# Patient Record
Sex: MALE | Race: WHITE | NOT HISPANIC OR LATINO | Employment: FULL TIME | URBAN - METROPOLITAN AREA
[De-identification: names, ages, dates, MRNs, and addresses within clinical notes are randomized per-mention and may not be internally consistent; named-entity substitution may affect disease eponyms.]

---

## 2023-03-08 ENCOUNTER — TELEPHONE (OUTPATIENT)
Dept: PRIMARY CARE | Facility: CLINIC | Age: 58
End: 2023-03-08
Payer: COMMERCIAL

## 2023-03-08 NOTE — TELEPHONE ENCOUNTER
Pt left a msg stating that he had a VV for a sinus infection and was put on antibiotics for 10 days.  He finished the antibiotics and is not feeling any better.  He is asking for a referral to see ENT.

## 2023-03-09 DIAGNOSIS — J32.9 CHRONIC SINUSITIS, UNSPECIFIED: ICD-10-CM

## 2023-03-09 RX ORDER — AZELASTINE 1 MG/ML
2 SPRAY, METERED NASAL 2 TIMES DAILY
Qty: 30 ML | Refills: 0 | Status: SHIPPED | OUTPATIENT
Start: 2023-03-09 | End: 2023-04-03

## 2023-03-16 DIAGNOSIS — J32.9 CHRONIC SINUSITIS, UNSPECIFIED LOCATION: ICD-10-CM

## 2023-03-16 NOTE — TELEPHONE ENCOUNTER
Referral placed for ENT.  He will go to whomever is the most local and make own appointment.  Number given.

## 2023-04-03 DIAGNOSIS — J32.9 CHRONIC SINUSITIS, UNSPECIFIED: ICD-10-CM

## 2023-04-03 RX ORDER — AZELASTINE 1 MG/ML
SPRAY, METERED NASAL
Qty: 30 ML | Refills: 11 | Status: SHIPPED | OUTPATIENT
Start: 2023-04-03 | End: 2023-07-24 | Stop reason: ALTCHOICE

## 2023-05-31 DIAGNOSIS — I10 ESSENTIAL (PRIMARY) HYPERTENSION: ICD-10-CM

## 2023-05-31 RX ORDER — LISINOPRIL AND HYDROCHLOROTHIAZIDE 10; 12.5 MG/1; MG/1
TABLET ORAL
Qty: 90 TABLET | Refills: 3 | Status: SHIPPED | OUTPATIENT
Start: 2023-05-31 | End: 2024-05-31

## 2023-06-01 NOTE — TELEPHONE ENCOUNTER
Vianey from Dr. Latesha Rueda's office called questioned referral that had diagnosis for chronic sinusitis that Dr. Rueda does not treat. After speaking to MA in our office it was decided that referral for endo was put in, in error. LVM back to Vianey that this was done in error and to disregard.

## 2023-07-24 ENCOUNTER — OFFICE VISIT (OUTPATIENT)
Dept: PRIMARY CARE | Facility: CLINIC | Age: 58
End: 2023-07-24
Payer: COMMERCIAL

## 2023-07-24 VITALS
WEIGHT: 213.6 LBS | HEART RATE: 73 BPM | TEMPERATURE: 97.7 F | HEIGHT: 73 IN | DIASTOLIC BLOOD PRESSURE: 84 MMHG | BODY MASS INDEX: 28.31 KG/M2 | SYSTOLIC BLOOD PRESSURE: 124 MMHG

## 2023-07-24 DIAGNOSIS — Z00.00 HEALTHCARE MAINTENANCE: Primary | ICD-10-CM

## 2023-07-24 DIAGNOSIS — E78.5 ELEVATED LIPIDS: ICD-10-CM

## 2023-07-24 DIAGNOSIS — Z12.5 SCREENING FOR PROSTATE CANCER: ICD-10-CM

## 2023-07-24 DIAGNOSIS — I10 HYPERTENSION, UNSPECIFIED TYPE: ICD-10-CM

## 2023-07-24 DIAGNOSIS — I25.10 PRECLINICAL CORONARY ARTERY DISEASE: ICD-10-CM

## 2023-07-24 PROBLEM — R79.89 ABNORMAL LFTS: Status: RESOLVED | Noted: 2023-07-24 | Resolved: 2023-07-24

## 2023-07-24 PROBLEM — J30.9 ALLERGIC RHINITIS: Status: ACTIVE | Noted: 2023-07-24

## 2023-07-24 PROBLEM — J32.9 CHRONIC SINUSITIS: Status: ACTIVE | Noted: 2023-07-24

## 2023-07-24 PROBLEM — K58.9 IBS (IRRITABLE BOWEL SYNDROME): Status: RESOLVED | Noted: 2023-07-24 | Resolved: 2023-07-24

## 2023-07-24 PROBLEM — R74.8 ELEVATED SERUM GAMMA-GLUTAMYL TRANSFERASE LEVEL: Status: RESOLVED | Noted: 2023-07-24 | Resolved: 2023-07-24

## 2023-07-24 PROBLEM — R09.81 NASAL CONGESTION: Status: ACTIVE | Noted: 2023-07-24

## 2023-07-24 PROBLEM — R15.2 FECAL URGENCY: Status: RESOLVED | Noted: 2023-07-24 | Resolved: 2023-07-24

## 2023-07-24 PROBLEM — R09.82 POST-NASAL DRIP: Status: ACTIVE | Noted: 2023-07-24

## 2023-07-24 PROCEDURE — 1036F TOBACCO NON-USER: CPT | Performed by: NURSE PRACTITIONER

## 2023-07-24 PROCEDURE — 3074F SYST BP LT 130 MM HG: CPT | Performed by: NURSE PRACTITIONER

## 2023-07-24 PROCEDURE — 3079F DIAST BP 80-89 MM HG: CPT | Performed by: NURSE PRACTITIONER

## 2023-07-24 PROCEDURE — 99396 PREV VISIT EST AGE 40-64: CPT | Performed by: NURSE PRACTITIONER

## 2023-07-24 RX ORDER — BETAMETHASONE VALERATE 1 MG/G
CREAM TOPICAL
COMMUNITY
Start: 2015-07-27

## 2023-07-24 RX ORDER — PSEUDOEPHEDRINE HCL 30 MG
TABLET ORAL
COMMUNITY
Start: 2022-06-07

## 2023-07-24 RX ORDER — ATORVASTATIN CALCIUM 20 MG/1
20 TABLET, FILM COATED ORAL DAILY
COMMUNITY
End: 2023-08-29

## 2023-07-24 ASSESSMENT — ENCOUNTER SYMPTOMS
ARTHRALGIAS: 0
WHEEZING: 0
BLOOD IN STOOL: 0
DYSURIA: 0
HEMATURIA: 0
FEVER: 0
EYE PAIN: 0
ADENOPATHY: 0
PALPITATIONS: 0
BACK PAIN: 0
FATIGUE: 0
COUGH: 1
CONSTIPATION: 0
SINUS PRESSURE: 1
NUMBNESS: 0
POLYDIPSIA: 0
NERVOUS/ANXIOUS: 0
SPEECH DIFFICULTY: 0
VOMITING: 0
UNEXPECTED WEIGHT CHANGE: 0
SLEEP DISTURBANCE: 1
EYE REDNESS: 0
CHEST TIGHTNESS: 0
ABDOMINAL PAIN: 0
POLYPHAGIA: 0
MYALGIAS: 0
NAUSEA: 0
RHINORRHEA: 1
DIFFICULTY URINATING: 0
CHILLS: 0
DIARRHEA: 0
SORE THROAT: 0
PHOTOPHOBIA: 0
BRUISES/BLEEDS EASILY: 0
HEADACHES: 0
EYE ITCHING: 0
NECK PAIN: 0
WEAKNESS: 0
DIZZINESS: 0
SHORTNESS OF BREATH: 0
FREQUENCY: 0
DYSPHORIC MOOD: 0
EYE DISCHARGE: 0

## 2023-07-24 ASSESSMENT — PATIENT HEALTH QUESTIONNAIRE - PHQ9
2. FEELING DOWN, DEPRESSED OR HOPELESS: NOT AT ALL
SUM OF ALL RESPONSES TO PHQ9 QUESTIONS 1 & 2: 0
1. LITTLE INTEREST OR PLEASURE IN DOING THINGS: NOT AT ALL

## 2023-07-24 NOTE — PROGRESS NOTES
"Subjective   Patient ID: Mazin Rausch is a 58 y.o. male who presents for transition of care and Annual Exam.    HPI  Last well exam: 1 year ago  Overall health has been good. Has been dealing with chronic sinus symptoms and also brain fog.  There have been no changes in the patients PMH, PSH, FH or social history. Reviewed today.  Diet:Overall tries to eat pretty healthy  Exercise: Treadmill and bikes- bikes 3 times a week. Treadmill daily in the cooler weather-15 minutes  Dental: Regular dental exams. Brushes 1 times daily. Flosses one time daily.  Vision: Last Eye exam 4/2023    Corrected Vision with glasses  Tobacco Use: No tobacco.  Alcohol Use: 1 beer once a month  Sexually active:  and sexually active. No issues with ED  Birth control: Wife postmenopausal  Immunizations: Discussed the Shingrix vaccine  Colonoscopy: Due in 2 years  Prostate Cancer Screening: ordered today    Last labs:   Lab Results   Component Value Date    GLUCOSE 91 06/07/2022    CALCIUM 9.5 06/07/2022     06/07/2022    K 4.4 06/07/2022    CO2 31 06/07/2022     06/07/2022    BUN 14 06/07/2022    CREATININE 0.89 06/07/2022      Lab Results   Component Value Date    WBC 5.8 06/07/2022    HGB 15.9 06/07/2022    HCT 48.9 06/07/2022    MCV 89 06/07/2022     06/07/2022      Lab Results   Component Value Date    PSA 0.43 06/07/2022    PSA 0.51 10/23/2019      Lab Results   Component Value Date    CHOL 186 06/07/2022    CHOL 171 10/23/2019     Lab Results   Component Value Date    HDL 49.6 06/07/2022    HDL 47.0 10/23/2019     No results found for: \"LDLCALC\"  Lab Results   Component Value Date    TRIG 161 (H) 06/07/2022    TRIG 155 (H) 10/23/2019     No components found for: \"CHOLHDL\"       Review of Systems   Constitutional:  Negative for chills, fatigue, fever and unexpected weight change.   HENT:  Positive for postnasal drip, rhinorrhea and sinus pressure. Negative for congestion, ear pain, hearing loss, nosebleeds, " "sore throat and tinnitus.    Eyes:  Negative for photophobia, pain, discharge, redness, itching and visual disturbance.   Respiratory:  Positive for cough. Negative for chest tightness, shortness of breath and wheezing.    Cardiovascular:  Negative for chest pain, palpitations and leg swelling.   Gastrointestinal:  Negative for abdominal pain, blood in stool, constipation, diarrhea, nausea and vomiting.   Endocrine: Negative for cold intolerance, heat intolerance, polydipsia, polyphagia and polyuria.   Genitourinary:  Negative for difficulty urinating, dysuria, frequency, hematuria and urgency.   Musculoskeletal:  Negative for arthralgias, back pain, myalgias and neck pain.   Skin:  Negative for rash.   Neurological:  Negative for dizziness, syncope, speech difficulty, weakness, numbness and headaches.   Hematological:  Negative for adenopathy. Does not bruise/bleed easily.   Psychiatric/Behavioral:  Positive for sleep disturbance. Negative for dysphoric mood. The patient is not nervous/anxious.      Objective   /84   Pulse 73   Temp 36.5 °C (97.7 °F)   Ht 1.842 m (6' 0.5\")   Wt 96.9 kg (213 lb 9.6 oz)   BMI 28.57 kg/m²     Physical Exam  Constitutional:       General: He is not in acute distress.     Appearance: Normal appearance. He is not toxic-appearing.   HENT:      Head: Normocephalic and atraumatic.      Right Ear: Tympanic membrane and ear canal normal.      Left Ear: Tympanic membrane and ear canal normal.      Nose: Nose normal.      Mouth/Throat:      Mouth: Mucous membranes are moist.      Pharynx: Oropharynx is clear.   Eyes:      Extraocular Movements: Extraocular movements intact.      Conjunctiva/sclera: Conjunctivae normal.      Pupils: Pupils are equal, round, and reactive to light.   Neck:      Thyroid: No thyroid mass or thyromegaly.      Vascular: No carotid bruit.   Cardiovascular:      Rate and Rhythm: Normal rate and regular rhythm.      Pulses: Normal pulses.      Heart sounds: " Normal heart sounds, S1 normal and S2 normal. No murmur heard.  Pulmonary:      Effort: Pulmonary effort is normal. No respiratory distress.      Breath sounds: Normal breath sounds.   Abdominal:      General: Abdomen is flat. Bowel sounds are normal.      Palpations: Abdomen is soft.      Tenderness: There is no abdominal tenderness.   Musculoskeletal:         General: Normal range of motion.      Cervical back: Normal range of motion and neck supple.      Right lower leg: No edema.      Left lower leg: No edema.   Lymphadenopathy:      Cervical: No cervical adenopathy.   Skin:     General: Skin is warm and dry.   Neurological:      Mental Status: He is alert and oriented to person, place, and time.      Cranial Nerves: No cranial nerve deficit.      Sensory: No sensory deficit.      Motor: No weakness.      Coordination: Coordination normal.      Gait: Gait normal.      Deep Tendon Reflexes: Reflexes are normal and symmetric. Reflexes normal.   Psychiatric:         Attention and Perception: Attention normal.         Mood and Affect: Mood and affect normal.         Speech: Speech normal.         Behavior: Behavior normal.         Thought Content: Thought content normal.         Judgment: Judgment normal.         Assessment/Plan   Problem List Items Addressed This Visit       Hypertension - Primary    Relevant Orders    CT cardiac scoring wo IV contrast    Elevated lipids    Relevant Orders    CT cardiac scoring wo IV contrast    Preclinical coronary artery disease    Relevant Orders    CT cardiac scoring wo IV contrast     Other Visit Diagnoses       Screening for prostate cancer        Relevant Orders    Prostate Specific Antigen    Healthcare maintenance        Relevant Orders    Comprehensive Metabolic Panel    CBC    Lipid Panel    TSH with reflex to Free T4 if abnormal                 It has been a pleasure seeing you today!

## 2023-07-24 NOTE — PATIENT INSTRUCTIONS
Focus on a low sodium/low fat diet (whole grains, fresh fruits and vegetables, lean meats). Avoid foods high in sugar.  Increase exercise as tolerated.  Obtain fasting labs as ordered today.  Ct calcium score ordered today.

## 2023-07-31 ENCOUNTER — LAB (OUTPATIENT)
Dept: LAB | Facility: LAB | Age: 58
End: 2023-07-31
Payer: COMMERCIAL

## 2023-07-31 DIAGNOSIS — Z12.5 SCREENING FOR PROSTATE CANCER: ICD-10-CM

## 2023-07-31 DIAGNOSIS — Z00.00 HEALTHCARE MAINTENANCE: ICD-10-CM

## 2023-07-31 LAB
ALANINE AMINOTRANSFERASE (SGPT) (U/L) IN SER/PLAS: 16 U/L (ref 10–52)
ALBUMIN (G/DL) IN SER/PLAS: 4.3 G/DL (ref 3.4–5)
ALKALINE PHOSPHATASE (U/L) IN SER/PLAS: 69 U/L (ref 33–120)
ANION GAP IN SER/PLAS: 10 MMOL/L (ref 10–20)
ASPARTATE AMINOTRANSFERASE (SGOT) (U/L) IN SER/PLAS: 21 U/L (ref 9–39)
BILIRUBIN TOTAL (MG/DL) IN SER/PLAS: 0.7 MG/DL (ref 0–1.2)
CALCIUM (MG/DL) IN SER/PLAS: 9.1 MG/DL (ref 8.6–10.3)
CARBON DIOXIDE, TOTAL (MMOL/L) IN SER/PLAS: 29 MMOL/L (ref 21–32)
CHLORIDE (MMOL/L) IN SER/PLAS: 104 MMOL/L (ref 98–107)
CHOLESTEROL (MG/DL) IN SER/PLAS: 171 MG/DL (ref 0–199)
CHOLESTEROL IN HDL (MG/DL) IN SER/PLAS: 49.1 MG/DL
CHOLESTEROL/HDL RATIO: 3.5
CREATININE (MG/DL) IN SER/PLAS: 0.9 MG/DL (ref 0.5–1.3)
ERYTHROCYTE DISTRIBUTION WIDTH (RATIO) BY AUTOMATED COUNT: 12.9 % (ref 11.5–14.5)
ERYTHROCYTE MEAN CORPUSCULAR HEMOGLOBIN CONCENTRATION (G/DL) BY AUTOMATED: 33 G/DL (ref 32–36)
ERYTHROCYTE MEAN CORPUSCULAR VOLUME (FL) BY AUTOMATED COUNT: 88 FL (ref 80–100)
ERYTHROCYTES (10*6/UL) IN BLOOD BY AUTOMATED COUNT: 5.31 X10E12/L (ref 4.5–5.9)
GFR MALE: >90 ML/MIN/1.73M2
GLUCOSE (MG/DL) IN SER/PLAS: 95 MG/DL (ref 74–99)
HEMATOCRIT (%) IN BLOOD BY AUTOMATED COUNT: 46.6 % (ref 41–52)
HEMOGLOBIN (G/DL) IN BLOOD: 15.4 G/DL (ref 13.5–17.5)
LDL: 89 MG/DL (ref 0–99)
LEUKOCYTES (10*3/UL) IN BLOOD BY AUTOMATED COUNT: 7 X10E9/L (ref 4.4–11.3)
PLATELETS (10*3/UL) IN BLOOD AUTOMATED COUNT: 249 X10E9/L (ref 150–450)
POTASSIUM (MMOL/L) IN SER/PLAS: 4.4 MMOL/L (ref 3.5–5.3)
PROSTATE SPECIFIC AG (NG/ML) IN SER/PLAS: 0.4 NG/ML (ref 0–4)
PROTEIN TOTAL: 6.6 G/DL (ref 6.4–8.2)
SODIUM (MMOL/L) IN SER/PLAS: 139 MMOL/L (ref 136–145)
THYROTROPIN (MIU/L) IN SER/PLAS BY DETECTION LIMIT <= 0.05 MIU/L: 1.32 MIU/L (ref 0.44–3.98)
TRIGLYCERIDE (MG/DL) IN SER/PLAS: 167 MG/DL (ref 0–149)
UREA NITROGEN (MG/DL) IN SER/PLAS: 14 MG/DL (ref 6–23)
VLDL: 33 MG/DL (ref 0–40)

## 2023-07-31 PROCEDURE — 84443 ASSAY THYROID STIM HORMONE: CPT

## 2023-07-31 PROCEDURE — 80061 LIPID PANEL: CPT

## 2023-07-31 PROCEDURE — 36415 COLL VENOUS BLD VENIPUNCTURE: CPT

## 2023-07-31 PROCEDURE — 84153 ASSAY OF PSA TOTAL: CPT

## 2023-07-31 PROCEDURE — 85027 COMPLETE CBC AUTOMATED: CPT

## 2023-07-31 PROCEDURE — 80053 COMPREHEN METABOLIC PANEL: CPT

## 2023-08-29 DIAGNOSIS — E78.5 HYPERLIPIDEMIA, UNSPECIFIED: ICD-10-CM

## 2023-08-29 RX ORDER — ATORVASTATIN CALCIUM 20 MG/1
20 TABLET, FILM COATED ORAL DAILY
Qty: 90 TABLET | Refills: 3 | Status: SHIPPED | OUTPATIENT
Start: 2023-08-29 | End: 2023-10-19

## 2023-10-01 ASSESSMENT — SLIT LAMP EXAM - LIDS
COMMENTS: GOOD POSITION
COMMENTS: GOOD POSITION

## 2023-10-01 ASSESSMENT — EXTERNAL EXAM - LEFT EYE: OS_EXAM: NORMAL

## 2023-10-01 ASSESSMENT — EXTERNAL EXAM - RIGHT EYE: OD_EXAM: NORMAL

## 2023-10-03 ENCOUNTER — OFFICE VISIT (OUTPATIENT)
Dept: OPHTHALMOLOGY | Facility: CLINIC | Age: 58
End: 2023-10-03
Payer: COMMERCIAL

## 2023-10-03 DIAGNOSIS — H43.811 PVD (POSTERIOR VITREOUS DETACHMENT), RIGHT EYE: Primary | ICD-10-CM

## 2023-10-03 DIAGNOSIS — H52.13 MYOPIA, BILATERAL: ICD-10-CM

## 2023-10-03 DIAGNOSIS — Z83.518 FAMILY HISTORY OF MACULAR DEGENERATION: ICD-10-CM

## 2023-10-03 DIAGNOSIS — H18.523 ANTERIOR BASEMENT MEMBRANE DYSTROPHY (ABMD) OF BOTH EYES: ICD-10-CM

## 2023-10-03 DIAGNOSIS — H52.203 ASTIGMATISM OF BOTH EYES, UNSPECIFIED TYPE: ICD-10-CM

## 2023-10-03 DIAGNOSIS — H25.813 COMBINED FORMS OF AGE-RELATED CATARACT OF BOTH EYES: ICD-10-CM

## 2023-10-03 DIAGNOSIS — H52.4 PRESBYOPIA: ICD-10-CM

## 2023-10-03 PROCEDURE — 99203 OFFICE O/P NEW LOW 30 MIN: CPT | Performed by: OPHTHALMOLOGY

## 2023-10-03 ASSESSMENT — ENCOUNTER SYMPTOMS
HEMATOLOGIC/LYMPHATIC NEGATIVE: 0
PSYCHIATRIC NEGATIVE: 0
GASTROINTESTINAL NEGATIVE: 0
ENDOCRINE NEGATIVE: 0
ALLERGIC/IMMUNOLOGIC NEGATIVE: 0
NEUROLOGICAL NEGATIVE: 0
EYES NEGATIVE: 0
CONSTITUTIONAL NEGATIVE: 0
MUSCULOSKELETAL NEGATIVE: 0
RESPIRATORY NEGATIVE: 0
CARDIOVASCULAR NEGATIVE: 0

## 2023-10-03 ASSESSMENT — CUP TO DISC RATIO
OD_RATIO: 0.25
OS_RATIO: 0.25

## 2023-10-03 ASSESSMENT — REFRACTION_MANIFEST
OD_ADD: +2.25
OD_CYLINDER: -2.50
OD_SPHERE: -1.50
OD_AXIS: 105
OS_SPHERE: -2.50
OS_CYLINDER: -1.50
OS_ADD: +2.25
OS_AXIS: 090

## 2023-10-03 ASSESSMENT — REFRACTION_WEARINGRX
OS_CYLINDER: -1.50
OS_ADD: +1.50
OD_SPHERE: -1.50
OD_ADD: +1.50
OS_AXIS: 090
OD_CYLINDER: -2.50
OD_AXIS: 105
OS_SPHERE: -2.50

## 2023-10-03 ASSESSMENT — VISUAL ACUITY
OS_CC+: +
CORRECTION_TYPE: GLASSES
METHOD: SNELLEN - LINEAR
OD_CC: 20/40
OS_CC: 20/25

## 2023-10-03 ASSESSMENT — TONOMETRY
OD_IOP_MMHG: 16
IOP_METHOD: GOLDMANN APPLANATION
OS_IOP_MMHG: 16

## 2023-10-03 NOTE — PROGRESS NOTES
Assessment/Plan   Diagnoses and all orders for this visit:    PVD (posterior vitreous detachment), right eye  Family history of macular degeneration - father  -Patient with flashes and floaters for 6 weeks, symptoms diminishing with time.  -No retinal tear or detachment seen on exam. Retinal detachment symptoms discussed.   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 Will refer to retina service for evaluation.     Combined forms of age-related cataract of both eyes  -Not visually significant at this time. Monitor.     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  Recommended to use artificial tears as needed.    Myopia, bilateral  Astigmatism of both eyes, unspecified type  Presbyopia  -Continue current glasses at this time.      No history of intraocular surgery/refractive surgery.   No FH of AMD/glaucoma

## 2023-10-04 ENCOUNTER — ANCILLARY PROCEDURE (OUTPATIENT)
Dept: RADIOLOGY | Facility: CLINIC | Age: 58
End: 2023-10-04
Payer: COMMERCIAL

## 2023-10-04 DIAGNOSIS — I10 ESSENTIAL (PRIMARY) HYPERTENSION: ICD-10-CM

## 2023-10-04 DIAGNOSIS — I25.10 ATHEROSCLEROTIC HEART DISEASE OF NATIVE CORONARY ARTERY WITHOUT ANGINA PECTORIS: ICD-10-CM

## 2023-10-04 DIAGNOSIS — I25.10 PRECLINICAL CORONARY ARTERY DISEASE: Primary | ICD-10-CM

## 2023-10-04 DIAGNOSIS — E78.5 HYPERLIPIDEMIA, UNSPECIFIED: ICD-10-CM

## 2023-10-04 PROBLEM — H52.4 PRESBYOPIA: Status: ACTIVE | Noted: 2023-10-04

## 2023-10-04 PROBLEM — H52.203 ASTIGMATISM OF BOTH EYES: Status: ACTIVE | Noted: 2023-10-04

## 2023-10-04 PROBLEM — H18.523 ANTERIOR BASEMENT MEMBRANE DYSTROPHY (ABMD) OF BOTH EYES: Status: ACTIVE | Noted: 2023-10-04

## 2023-10-04 PROBLEM — Z83.518 FAMILY HISTORY OF MACULAR DEGENERATION: Status: ACTIVE | Noted: 2023-10-04

## 2023-10-04 PROBLEM — H52.13 MYOPIA, BILATERAL: Status: ACTIVE | Noted: 2023-10-04

## 2023-10-04 PROBLEM — H25.813 COMBINED FORMS OF AGE-RELATED CATARACT OF BOTH EYES: Status: ACTIVE | Noted: 2023-10-04

## 2023-10-04 PROBLEM — H43.811 PVD (POSTERIOR VITREOUS DETACHMENT), RIGHT EYE: Status: ACTIVE | Noted: 2023-10-04

## 2023-10-04 PROCEDURE — 75571 CT HRT W/O DYE W/CA TEST: CPT

## 2023-10-05 DIAGNOSIS — I25.10 PRECLINICAL CORONARY ARTERY DISEASE: Primary | ICD-10-CM

## 2023-10-18 ENCOUNTER — APPOINTMENT (OUTPATIENT)
Dept: OPHTHALMOLOGY | Facility: CLINIC | Age: 58
End: 2023-10-18
Payer: COMMERCIAL

## 2023-10-19 ENCOUNTER — OFFICE VISIT (OUTPATIENT)
Dept: CARDIOLOGY | Facility: CLINIC | Age: 58
End: 2023-10-19
Payer: COMMERCIAL

## 2023-10-19 VITALS
DIASTOLIC BLOOD PRESSURE: 72 MMHG | HEART RATE: 79 BPM | HEIGHT: 72 IN | BODY MASS INDEX: 28.28 KG/M2 | WEIGHT: 208.8 LBS | SYSTOLIC BLOOD PRESSURE: 122 MMHG

## 2023-10-19 DIAGNOSIS — I10 HYPERTENSION, UNSPECIFIED TYPE: Primary | ICD-10-CM

## 2023-10-19 DIAGNOSIS — E78.5 ELEVATED LIPIDS: ICD-10-CM

## 2023-10-19 DIAGNOSIS — R93.1 AGATSTON CORONARY ARTERY CALCIUM SCORE GREATER THAN 400: ICD-10-CM

## 2023-10-19 DIAGNOSIS — I25.10 CORONARY ARTERY DISEASE INVOLVING NATIVE CORONARY ARTERY OF NATIVE HEART WITHOUT ANGINA PECTORIS: ICD-10-CM

## 2023-10-19 PROCEDURE — 99204 OFFICE O/P NEW MOD 45 MIN: CPT | Performed by: INTERNAL MEDICINE

## 2023-10-19 PROCEDURE — 3078F DIAST BP <80 MM HG: CPT | Performed by: INTERNAL MEDICINE

## 2023-10-19 PROCEDURE — 99214 OFFICE O/P EST MOD 30 MIN: CPT | Performed by: INTERNAL MEDICINE

## 2023-10-19 PROCEDURE — 1036F TOBACCO NON-USER: CPT | Performed by: INTERNAL MEDICINE

## 2023-10-19 PROCEDURE — 3074F SYST BP LT 130 MM HG: CPT | Performed by: INTERNAL MEDICINE

## 2023-10-19 RX ORDER — ATORVASTATIN CALCIUM 40 MG/1
40 TABLET, FILM COATED ORAL DAILY
Qty: 30 TABLET | Refills: 11 | Status: SHIPPED | OUTPATIENT
Start: 2023-10-19 | End: 2023-11-20

## 2023-10-19 RX ORDER — ASPIRIN 81 MG/1
81 TABLET ORAL DAILY
COMMUNITY

## 2023-10-19 NOTE — PROGRESS NOTES
Chief Complaint:   Coronary Artery Disease     History Of Present Illness:    Mazin Rausch is a 58 y.o. male presenting with for evaluation of preclinical coronary artery disease detected by coronary artery calcium scoring.   The patient is tolerating guideline-directed medical therapy with antiplatelet and statin medication and is compliant.  The patient exercises regularly and follows a heart healthy diet.  The patient has been well and is not having any anginal symptoms or dyspnea on exertion.      Review of Systems  All pertinent systems have been reviewed and are negative except for what is stated in the history of present illness.    All other systems have been reviewed and are negative and noncontributory to this patient's current ailments.  .     Last Recorded Vitals:  Visit Vitals  /72 (BP Location: Right arm, Patient Position: Sitting, BP Cuff Size: Adult)   Pulse 79   Ht 1.829 m (6')   Wt 94.7 kg (208 lb 12.8 oz)   BMI 28.32 kg/m²   Smoking Status Never   BSA 2.19 m²        Past Medical History:  He has a past medical history of Agatston coronary artery calcium score greater than 400 (10/19/2023), Allergic (Penicillin), Cataract, Coronary artery disease involving native coronary artery of native heart without angina pectoris (10/19/2023), Elevated serum gamma-glutamyl transferase level (07/24/2023), Fecal urgency (07/24/2023), HL (hearing loss), Hypertension, IBS (irritable bowel syndrome) (07/24/2023), Personal history of diseases of the skin and subcutaneous tissue (11/09/2015), Personal history of other diseases of the respiratory system (02/15/2016), and Personal history of other mental and behavioral disorders (04/04/2017).    Past Surgical History:  He has a past surgical history that includes [place] tooth extraction (2000) and Nasal septum surgery.      Social History:  He reports that he has never smoked. He has never been exposed to tobacco smoke. He has never used smokeless tobacco.  Alcohol use questions deferred to the physician. He reports that he does not use drugs.    Family History:  Family History   Problem Relation Name Age of Onset    Diabetes Mother      Hypertension Mother      Breast cancer Mother      Parkinsonism Father  89    Other (amd) Father          Allergies:  Penicillins    Outpatient Medications:  Current Outpatient Medications   Medication Instructions    aspirin 81 mg, oral, Daily    atorvastatin (LIPITOR) 20 mg, oral, Daily    betamethasone valerate (Valisone) 0.1 % cream APPLY  CREAM TOPICALLY TO AFFECTED AREA TWICE DAILY    lisinopriL-hydrochlorothiazide 10-12.5 mg tablet TAKE 1 TABLET BY MOUTH EVERY DAY    pseudoephedrine (Sudafed) 30 mg tablet oral, Every 4 hours RT       Physical Exam:  Physical Exam  Vitals reviewed.   Constitutional:       General: He is not in acute distress.     Appearance: Normal appearance.   HENT:      Head: Normocephalic and atraumatic.      Nose: Nose normal.   Eyes:      Conjunctiva/sclera: Conjunctivae normal.   Cardiovascular:      Rate and Rhythm: Normal rate and regular rhythm.      Pulses: Normal pulses.      Heart sounds: No murmur heard.  Pulmonary:      Effort: Pulmonary effort is normal. No respiratory distress.      Breath sounds: Normal breath sounds. No wheezing, rhonchi or rales.   Abdominal:      General: Bowel sounds are normal. There is no distension.      Palpations: Abdomen is soft.      Tenderness: There is no abdominal tenderness.   Musculoskeletal:         General: No swelling.      Right lower leg: No edema.      Left lower leg: No edema.   Skin:     General: Skin is warm and dry.      Capillary Refill: Capillary refill takes less than 2 seconds.   Neurological:      General: No focal deficit present.      Mental Status: He is alert.   Psychiatric:         Mood and Affect: Mood normal.           Last Labs:  CBC -  Lab Results   Component Value Date    WBC 7.0 07/31/2023    HGB 15.4 07/31/2023    HCT 46.6 07/31/2023     "MCV 88 07/31/2023     07/31/2023       CMP -  Lab Results   Component Value Date    CALCIUM 9.1 07/31/2023    PROT 6.6 07/31/2023    ALBUMIN 4.3 07/31/2023    AST 21 07/31/2023    ALT 16 07/31/2023    ALKPHOS 69 07/31/2023    BILITOT 0.7 07/31/2023       LIPID PANEL -   Lab Results   Component Value Date    CHOL 171 07/31/2023    HDL 49.1 07/31/2023    CHHDL 3.5 07/31/2023    VLDL 33 07/31/2023    TRIG 167 (H) 07/31/2023       RENAL FUNCTION PANEL -   Lab Results   Component Value Date    K 4.4 07/31/2023       No results found for: \"BNP\", \"HGBA1C\"    Last Cardiology Tests:  ECG:  No results found for this or any previous visit from the past 1095 days.    Echo:  No echocardiogram results found for the past 12 months     Narrative & Impression   Interpreted By:  Taylor Mendoza,   STUDY:  CT CARDIAC SCORING WO IV CONTRAST;  10/4/2023 12:56 pm      INDICATION:  Signs/Symptoms: preclinical CAD.      COMPARISON:  None.      ACCESSION NUMBER(S):  [id number]      ORDERING CLINICIAN:  CHRISTA PARHAM      TECHNIQUE:  Using prospective ECG gating, CT scan of the coronary arteries was  performed without intravenous contrast. Coronary calcium scoring  was  performed according to the method of Agatston.      FINDINGS:  The score and distribution of calcium in the coronary arteries is as  follows:      .32,  .45,  LCx 205.49,  RCA 6.87,      Total   862.13      The visualized mid/lower ascending thoracic aorta measures 3.7 cm in  diameter. The heart is normal in size. No pericardial effusion is  present.      No gross evidence of mediastinal or hilar lymphadenopathy or masses  is identified. The visualized segments of the lungs are normally  expanded.      The visualized subdiaphragmatic structures appear intact.      IMPRESSION:  1. Coronary artery calcium score of 862.13*.           Assessment/Plan       1. Hypertension, unspecified type  Hypertension: The patient's blood pressure has been " well-controlled at today's appointment or by recent primary care provider's measurements/home measurements and meets their goal blood pressure per the ACC/AHA guidelines.  The patient has been compliant with their anti-hypertensive medications and is following a low sodium/DASH diet and performs regular exercise.  I advised continuation of their present medical treatment and lifestyle modification.        2. Elevated lipids  Hyperlipidemia: The patient's lipids are well controlled on chronic statin therapy and they are not meeting their goal LDL cholesterol (70) per the ACC/AHA guidelines.  The patient is compliant and tolerating statin therapy and is following a heart health diet and performs regular exercise.  The benefits of lipid lowering therapy have been discussed with the patient/family/caregiver. Increase statin.      3. Coronary artery disease involving native coronary artery of native heart without angina pectoris  CAD: The patient's CAD, as detailed in the HPI, has been clinically stable, without any anginal symptoms or dyspnea.  The patient will continue treatment with guideline-directed medical therapy with antiplatelet and statin medications and will continue regular exercise and a heart healthy diet.         4. Agatston coronary artery calcium score greater than 400  As above.       José Miguel Shields MD    Exclusive of any other services or procedures performed, I, José Miguel Shields MD , spent 30 minutes in duration for this visit today.  This time consisted of chart review, obtaining history, and/or performing the exam as documented above as well as documenting the clinical information for the encounter in the electronic record, discussing treatment options, plans, and/or goals with patient, family, and/or caregiver, refilling medications, updating the electronic record, ordering medicines, lab work, imaging, referrals, and/or procedures as documented above and communicating with other [facility]  professionals. I have discussed the results of laboratory, radiology, and cardiology studies with the patient and their family/caregiver.

## 2023-10-25 PROBLEM — J31.0 CHRONIC RHINITIS: Status: ACTIVE | Noted: 2023-10-25

## 2023-10-25 PROBLEM — R44.8 FACIAL PRESSURE: Status: ACTIVE | Noted: 2023-10-25

## 2023-10-25 PROBLEM — E66.3 OVERWEIGHT WITH BODY MASS INDEX (BMI) OF 28 TO 28.9 IN ADULT: Status: ACTIVE | Noted: 2023-10-25

## 2023-10-25 PROBLEM — R00.2 HEART PALPITATIONS: Status: ACTIVE | Noted: 2023-10-25

## 2023-10-25 RX ORDER — MOMETASONE FUROATE 100 %
POWDER (GRAM) MISCELLANEOUS
COMMUNITY
Start: 2023-08-02

## 2023-10-25 RX ORDER — FLUORIDE (SODIUM) 1.1 %
PASTE (ML) DENTAL
COMMUNITY
Start: 2023-07-26

## 2023-10-26 ENCOUNTER — CONSULT (OUTPATIENT)
Dept: ALLERGY | Facility: CLINIC | Age: 58
End: 2023-10-26
Payer: COMMERCIAL

## 2023-10-26 VITALS — BODY MASS INDEX: 28.48 KG/M2 | WEIGHT: 210 LBS | DIASTOLIC BLOOD PRESSURE: 72 MMHG | SYSTOLIC BLOOD PRESSURE: 119 MMHG

## 2023-10-26 DIAGNOSIS — H10.45 CHRONIC ALLERGIC CONJUNCTIVITIS: ICD-10-CM

## 2023-10-26 DIAGNOSIS — J30.1 NON-SEASONAL ALLERGIC RHINITIS DUE TO POLLEN: ICD-10-CM

## 2023-10-26 DIAGNOSIS — J30.89 NON-SEASONAL ALLERGIC RHINITIS DUE TO OTHER ALLERGIC TRIGGER: Primary | ICD-10-CM

## 2023-10-26 PROCEDURE — 99204 OFFICE O/P NEW MOD 45 MIN: CPT | Performed by: ALLERGY & IMMUNOLOGY

## 2023-10-26 PROCEDURE — 95004 PERQ TESTS W/ALRGNC XTRCS: CPT | Performed by: ALLERGY & IMMUNOLOGY

## 2023-10-26 RX ORDER — OLOPATADINE HYDROCHLORIDE AND MOMETASONE FUROATE 25; 665 UG/1; UG/1
2 SPRAY, METERED NASAL 2 TIMES DAILY
Qty: 29 G | Refills: 11 | Status: SHIPPED | OUTPATIENT
Start: 2023-10-26 | End: 2023-12-07 | Stop reason: ALTCHOICE

## 2023-10-26 NOTE — PROGRESS NOTES
"Subjective   Patient ID:   [id number]   Mazin Rausch is a 58 y.o. male who presents for Allergy Testing.    Chief Complaint   Patient presents with    Allergy Testing          HPI  This patient is here to evaluate for:    PND, Sinus for a couple years.   No ocular sxs  Sinus infections, sleep disturbance - denieds    Meds: pseudoephrine (from behind the counter)  Lack of efficacy : flonase, xhance, azelastine - just irritated/burning sensation.      Had 5 years of allergy shots, ending 8 years ago.   Helped and was \"good\"   This was with another Allergist outside, Dr. Jones.    But then     Also saw ENT and sinus CT clear.     Trigger: alcohol but he doesn't drink much at all  Had a non-alcoholic drink and that bothered him.     Weather changes  Not bothered by strong smells except inexpensive candles.    Pmhx: NO prostate hypertrophy  HTN, cholesterolemia.     Review of Systems   All other systems reviewed and are negative.            Objective     /72 (BP Location: Right arm, Patient Position: Sitting, BP Cuff Size: Adult)   Wt 95.3 kg (210 lb)   BMI 28.48 kg/m²      Physical Exam  Constitutional:       General: He is not in acute distress.     Appearance: Normal appearance. He is not ill-appearing.   HENT:      Head: Normocephalic and atraumatic.      Right Ear: Tympanic membrane, ear canal and external ear normal.      Left Ear: Tympanic membrane, ear canal and external ear normal.      Nose: Congestion present. No rhinorrhea.      Comments: Mild ITH, no epistaxis, no polyps     Mouth/Throat:      Mouth: Mucous membranes are moist.      Pharynx: Oropharynx is clear. No oropharyngeal exudate or posterior oropharyngeal erythema.   Eyes:      General:         Right eye: No discharge.         Left eye: No discharge.      Conjunctiva/sclera: Conjunctivae normal.   Cardiovascular:      Rate and Rhythm: Normal rate and regular rhythm.      Heart sounds: Normal heart sounds. No murmur heard.     No friction " rub. No gallop.   Pulmonary:      Effort: Pulmonary effort is normal. No respiratory distress.      Breath sounds: Normal breath sounds. No stridor. No wheezing, rhonchi or rales.   Chest:      Chest wall: No tenderness.   Abdominal:      General: Abdomen is flat.      Palpations: Abdomen is soft.   Musculoskeletal:         General: Normal range of motion.      Cervical back: Normal range of motion and neck supple.   Lymphadenopathy:      Cervical: No cervical adenopathy.   Skin:     General: Skin is warm and dry.      Findings: No erythema, lesion or rash.   Neurological:      General: No focal deficit present.      Mental Status: He is alert. Mental status is at baseline.   Psychiatric:         Mood and Affect: Mood normal.         Behavior: Behavior normal.         Thought Content: Thought content normal.         Judgment: Judgment normal.          Current Outpatient Medications   Medication Sig Dispense Refill    aspirin 81 mg EC tablet Take 1 tablet (81 mg) by mouth once daily.      atorvastatin (Lipitor) 40 mg tablet Take 1 tablet (40 mg) by mouth once daily. 30 tablet 11    betamethasone valerate (Valisone) 0.1 % cream APPLY  CREAM TOPICALLY TO AFFECTED AREA TWICE DAILY      lisinopriL-hydrochlorothiazide 10-12.5 mg tablet TAKE 1 TABLET BY MOUTH EVERY DAY 90 tablet 3    mometasone furoate, bulk, 100 % powder Compound Mometasone 1mg capsule to be added to sinus rinse twice daily.      PreviDent 5000 Booster Plus 1.1 % dental paste USE IN PLACE OF TOOTH PASTE      pseudoephedrine (Sudafed) 30 mg tablet Take by mouth every 4 hours.       No current facility-administered medications for this visit.        Scratch skin tests were positive to:    Dust mites, dogs, tree, Alternaria mold.      Assessment/Plan   Diagnoses and all orders for this visit:  Non-seasonal allergic rhinitis due to other allergic trigger  -     olopatadine-mometasone (Ryaltris) 665-25 mcg/spray spray,non-aerosol; Administer 2 sprays into  affected nostril(s) 2 times a day.  Chronic allergic conjunctivitis  -     olopatadine-mometasone (Ryaltris) 665-25 mcg/spray spray,non-aerosol; Administer 2 sprays into affected nostril(s) 2 times a day.  Non-seasonal allergic rhinitis due to pollen  -     olopatadine-mometasone (Ryaltris) 665-25 mcg/spray spray,non-aerosol; Administer 2 sprays into affected nostril(s) 2 times a day.      We discussed the treatment options for this patient's allergies. I recommended allergy avoidance measures and handouts were given to the patient.  Also, other treatment options include medication and, if not improved or there is a desire to limit medication usage, this patient would qualify for allergy immunotherapy. Of note, he responded well to SCIT in past that was completed 8 years ago.     This patient will return to complete allergy testing with intradermal skin tests in 2-3 months. Be sure to be off antihistamines for 3 days.    Add the new nosespray      Darryl Rodriguez MD

## 2023-11-08 ENCOUNTER — TELEPHONE (OUTPATIENT)
Dept: CARDIOLOGY | Facility: CLINIC | Age: 58
End: 2023-11-08
Payer: COMMERCIAL

## 2023-11-08 NOTE — TELEPHONE ENCOUNTER
Spoke with pt and went over stress prep. NPO 4 hrs Prior, No medications to hold, no caffeine 24 hrs prior. Pt verbalized understanding

## 2023-11-13 ENCOUNTER — HOSPITAL ENCOUNTER (OUTPATIENT)
Dept: CARDIOLOGY | Facility: CLINIC | Age: 58
Discharge: HOME | End: 2023-11-13
Payer: COMMERCIAL

## 2023-11-13 VITALS
DIASTOLIC BLOOD PRESSURE: 74 MMHG | BODY MASS INDEX: 27.63 KG/M2 | SYSTOLIC BLOOD PRESSURE: 112 MMHG | WEIGHT: 204 LBS | HEIGHT: 72 IN | HEART RATE: 63 BPM

## 2023-11-13 DIAGNOSIS — E78.5 ELEVATED LIPIDS: ICD-10-CM

## 2023-11-13 DIAGNOSIS — I25.10 CORONARY ARTERY DISEASE INVOLVING NATIVE CORONARY ARTERY OF NATIVE HEART WITHOUT ANGINA PECTORIS: ICD-10-CM

## 2023-11-13 DIAGNOSIS — I10 HYPERTENSION, UNSPECIFIED TYPE: ICD-10-CM

## 2023-11-13 DIAGNOSIS — R93.1 AGATSTON CORONARY ARTERY CALCIUM SCORE GREATER THAN 400: ICD-10-CM

## 2023-11-13 PROCEDURE — A9502 TC99M TETROFOSMIN: HCPCS | Performed by: INTERNAL MEDICINE

## 2023-11-13 PROCEDURE — 78452 HT MUSCLE IMAGE SPECT MULT: CPT

## 2023-11-13 PROCEDURE — 3430000001 HC RX 343 DIAGNOSTIC RADIOPHARMACEUTICALS: Performed by: INTERNAL MEDICINE

## 2023-11-13 PROCEDURE — 93016 CV STRESS TEST SUPVJ ONLY: CPT | Performed by: INTERNAL MEDICINE

## 2023-11-13 PROCEDURE — 93018 CV STRESS TEST I&R ONLY: CPT | Performed by: INTERNAL MEDICINE

## 2023-11-13 PROCEDURE — 78452 HT MUSCLE IMAGE SPECT MULT: CPT | Performed by: INTERNAL MEDICINE

## 2023-11-13 RX ADMIN — TETROFOSMIN 30 MILLICURIE: 0.23 INJECTION, POWDER, LYOPHILIZED, FOR SOLUTION INTRAVENOUS at 13:36

## 2023-11-13 RX ADMIN — TETROFOSMIN 10 MILLICURIE: 0.23 INJECTION, POWDER, LYOPHILIZED, FOR SOLUTION INTRAVENOUS at 12:18

## 2023-11-15 ENCOUNTER — OFFICE VISIT (OUTPATIENT)
Dept: OPHTHALMOLOGY | Facility: CLINIC | Age: 58
End: 2023-11-15
Payer: COMMERCIAL

## 2023-11-15 DIAGNOSIS — H43.811 PVD (POSTERIOR VITREOUS DETACHMENT), RIGHT EYE: Primary | ICD-10-CM

## 2023-11-15 DIAGNOSIS — H35.079: ICD-10-CM

## 2023-11-15 PROCEDURE — 99203 OFFICE O/P NEW LOW 30 MIN: CPT

## 2023-11-15 PROCEDURE — 92134 CPTRZ OPH DX IMG PST SGM RTA: CPT | Mod: BILATERAL PROCEDURE

## 2023-11-15 PROCEDURE — 92250 FUNDUS PHOTOGRAPHY W/I&R: CPT

## 2023-11-15 ASSESSMENT — CUP TO DISC RATIO
OS_RATIO: 0.25
OD_RATIO: 0.25

## 2023-11-15 ASSESSMENT — TONOMETRY
IOP_METHOD: GOLDMANN APPLANATION
OS_IOP_MMHG: 18
OD_IOP_MMHG: 17

## 2023-11-15 ASSESSMENT — VISUAL ACUITY
OD_CC+: -2
METHOD: SNELLEN - LINEAR
OS_CC: 20/25
CORRECTION_TYPE: GLASSES
OD_CC: 20/20

## 2023-11-15 ASSESSMENT — EXTERNAL EXAM - LEFT EYE: OS_EXAM: NORMAL

## 2023-11-15 ASSESSMENT — SLIT LAMP EXAM - LIDS
COMMENTS: GOOD POSITION
COMMENTS: GOOD POSITION

## 2023-11-15 ASSESSMENT — EXTERNAL EXAM - RIGHT EYE: OD_EXAM: NORMAL

## 2023-11-15 NOTE — PROGRESS NOTES
PVD (posterior vitreous detachment), right eye  - no tears, breaks of detachments noted on exam  - symptoms began approximately 3 months prior to this examination  - retinal detachment (RD) precautions discussed  - monitor    Family history of macular degeneration - father  Macular Telangiectasias OD  - Telangectatic vessels inferior juxtafoveal with right angle vessel   - no OCT findings of classic mac-tel  - no history of diabetes mellitus (DM)  - +HTN but controlled  - FAF today appear unremarkable    Impression:  - ? 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

## 2023-11-17 DIAGNOSIS — I10 HYPERTENSION, UNSPECIFIED TYPE: ICD-10-CM

## 2023-11-17 DIAGNOSIS — I25.10 CORONARY ARTERY DISEASE INVOLVING NATIVE CORONARY ARTERY OF NATIVE HEART WITHOUT ANGINA PECTORIS: ICD-10-CM

## 2023-11-17 DIAGNOSIS — R93.1 AGATSTON CORONARY ARTERY CALCIUM SCORE GREATER THAN 400: ICD-10-CM

## 2023-11-17 DIAGNOSIS — E78.5 ELEVATED LIPIDS: ICD-10-CM

## 2023-11-20 RX ORDER — ATORVASTATIN CALCIUM 40 MG/1
40 TABLET, FILM COATED ORAL DAILY
Qty: 90 TABLET | Refills: 3 | Status: SHIPPED | OUTPATIENT
Start: 2023-11-20

## 2023-12-07 ENCOUNTER — OFFICE VISIT (OUTPATIENT)
Dept: ALLERGY | Facility: CLINIC | Age: 58
End: 2023-12-07
Payer: COMMERCIAL

## 2023-12-07 VITALS
SYSTOLIC BLOOD PRESSURE: 128 MMHG | WEIGHT: 200 LBS | BODY MASS INDEX: 27.09 KG/M2 | HEIGHT: 72 IN | HEART RATE: 79 BPM | DIASTOLIC BLOOD PRESSURE: 81 MMHG

## 2023-12-07 DIAGNOSIS — H10.45 CHRONIC ALLERGIC CONJUNCTIVITIS: Primary | ICD-10-CM

## 2023-12-07 DIAGNOSIS — J30.1 NON-SEASONAL ALLERGIC RHINITIS DUE TO POLLEN: ICD-10-CM

## 2023-12-07 DIAGNOSIS — J30.1 ALLERGIC RHINITIS DUE TO POLLEN, UNSPECIFIED SEASONALITY: ICD-10-CM

## 2023-12-07 PROCEDURE — 1036F TOBACCO NON-USER: CPT | Performed by: ALLERGY & IMMUNOLOGY

## 2023-12-07 PROCEDURE — 3079F DIAST BP 80-89 MM HG: CPT | Performed by: ALLERGY & IMMUNOLOGY

## 2023-12-07 PROCEDURE — 95024 IQ TESTS W/ALLERGENIC XTRCS: CPT | Performed by: ALLERGY & IMMUNOLOGY

## 2023-12-07 PROCEDURE — 3074F SYST BP LT 130 MM HG: CPT | Performed by: ALLERGY & IMMUNOLOGY

## 2023-12-07 PROCEDURE — 99214 OFFICE O/P EST MOD 30 MIN: CPT | Performed by: ALLERGY & IMMUNOLOGY

## 2023-12-07 ASSESSMENT — PAIN SCALES - GENERAL: PAINLEVEL: 0-NO PAIN

## 2023-12-07 NOTE — PROGRESS NOTES
"Subjective   Patient ID:   [id number]   Mazin Rausch is a 58 y.o. male who presents for Allergy Testing (ID TESTING).    Chief Complaint   Patient presents with    Allergy Testing     ID TESTING          HPI  This patient is here to evaluate for:    Still with a lot of ant and post nasal drainage, congestion in the AM that clears after his am shower.  Sinus pressure. Clearing his throat.   Not sneezing    He tried the new nasal spray for a couple samples and no difference.   Allergy shots worked well for the 5 years.   Helped with itchy eyes - that has improved.     He has zyrtec but sudafed working better for him.     +snoring.   Unsure if apneas  He requests a home sleep study and interested in recommendations.     previously reported:   PND, Sinus for a couple years.   No ocular sxs  Sinus infections, sleep disturbance - denieds    Meds: pseudoephrine (from behind the counter)  Lack of efficacy : flonase, xhance, azelastine - just irritated/burning sensation.      Had 5 years of allergy shots, ending 8 years ago.   Helped and was \"good\"   This was with another Allergist outside, Dr. Jones.    Also saw ENT and sinus CT clear.     Trigger: alcohol but he doesn't drink much at all  Had a non-alcoholic drink and that bothered him.     Weather changes  Not bothered by strong smells except inexpensive candles.    Pmhx: NO prostate hypertrophy  HTN, cholesterolemia.     Review of Systems   All other systems reviewed and are negative.            Objective     /81 (BP Location: Right arm, Patient Position: Sitting, BP Cuff Size: Adult)   Pulse 79   Ht 1.829 m (6')   Wt 90.7 kg (200 lb)   BMI 27.12 kg/m²      Physical Exam  Constitutional:       General: He is not in acute distress.     Appearance: Normal appearance. He is not ill-appearing.   HENT:      Head: Normocephalic and atraumatic.      Right Ear: Tympanic membrane, ear canal and external ear normal.      Left Ear: Tympanic membrane, ear canal and " external ear normal.      Nose: Congestion present. No rhinorrhea.      Comments: Mild ITH, no epistaxis, no polyps     Mouth/Throat:      Mouth: Mucous membranes are moist.      Pharynx: Oropharynx is clear. No oropharyngeal exudate or posterior oropharyngeal erythema.   Eyes:      General:         Right eye: No discharge.         Left eye: No discharge.      Conjunctiva/sclera: Conjunctivae normal.   Cardiovascular:      Rate and Rhythm: Normal rate and regular rhythm.      Heart sounds: Normal heart sounds. No murmur heard.     No friction rub. No gallop.   Pulmonary:      Effort: Pulmonary effort is normal. No respiratory distress.      Breath sounds: Normal breath sounds. No stridor. No wheezing, rhonchi or rales.   Chest:      Chest wall: No tenderness.   Abdominal:      General: Abdomen is flat.      Palpations: Abdomen is soft.   Musculoskeletal:         General: Normal range of motion.      Cervical back: Normal range of motion and neck supple.   Lymphadenopathy:      Cervical: No cervical adenopathy.   Skin:     General: Skin is warm and dry.      Findings: No erythema, lesion or rash.   Neurological:      General: No focal deficit present.      Mental Status: He is alert. Mental status is at baseline.   Psychiatric:         Mood and Affect: Mood normal.         Behavior: Behavior normal.         Thought Content: Thought content normal.         Judgment: Judgment normal.            Current Outpatient Medications   Medication Sig Dispense Refill    aspirin 81 mg EC tablet Take 1 tablet (81 mg) by mouth once daily.      atorvastatin (Lipitor) 40 mg tablet TAKE 1 TABLET BY MOUTH EVERY DAY 90 tablet 3    betamethasone valerate (Valisone) 0.1 % cream APPLY  CREAM TOPICALLY TO AFFECTED AREA TWICE DAILY      lisinopriL-hydrochlorothiazide 10-12.5 mg tablet TAKE 1 TABLET BY MOUTH EVERY DAY 90 tablet 3    mometasone furoate, bulk, 100 % powder Compound Mometasone 1mg capsule to be added to sinus rinse twice daily.       PreviDent 5000 Booster Plus 1.1 % dental paste USE IN PLACE OF TOOTH PASTE      pseudoephedrine (Sudafed) 30 mg tablet Take by mouth every 4 hours.       No current facility-administered medications for this visit.        ID skin tests were positive to:  Grass,ragweed       Assessment/Plan   Diagnoses and all orders for this visit:  Chronic allergic conjunctivitis  Non-seasonal allergic rhinitis due to pollen  Allergic rhinitis due to pollen, unspecified seasonality      We discussed the treatment options for this patient's allergies. I recommended allergy avoidance measures and handouts were given to the patient.  Also, other treatment options include medication and, if not improved or there is a desire to limit medication usage, this patient would qualify for allergy immunotherapy.     Start zyrtec or allegra daily for the next month to determine if this helps.  At this time, he doesn't feel like allergy shots are necessary.   If this changes, then return the IT consent form.     Follow-up in a couple weeks for home sleep study     Darryl Rodriguez MD

## 2023-12-15 ENCOUNTER — OFFICE VISIT (OUTPATIENT)
Dept: ALLERGY | Facility: CLINIC | Age: 58
End: 2023-12-15
Payer: COMMERCIAL

## 2023-12-15 VITALS
WEIGHT: 198 LBS | BODY MASS INDEX: 26.82 KG/M2 | DIASTOLIC BLOOD PRESSURE: 73 MMHG | HEIGHT: 72 IN | HEART RATE: 69 BPM | SYSTOLIC BLOOD PRESSURE: 118 MMHG

## 2023-12-15 DIAGNOSIS — J30.1 ALLERGIC RHINITIS DUE TO POLLEN, UNSPECIFIED SEASONALITY: ICD-10-CM

## 2023-12-15 DIAGNOSIS — G47.30 SLEEP-DISORDERED BREATHING: ICD-10-CM

## 2023-12-15 DIAGNOSIS — H10.45 CHRONIC ALLERGIC CONJUNCTIVITIS: Primary | ICD-10-CM

## 2023-12-15 PROCEDURE — 99213 OFFICE O/P EST LOW 20 MIN: CPT | Performed by: ALLERGY & IMMUNOLOGY

## 2023-12-15 PROCEDURE — 3074F SYST BP LT 130 MM HG: CPT | Performed by: ALLERGY & IMMUNOLOGY

## 2023-12-15 PROCEDURE — 3078F DIAST BP <80 MM HG: CPT | Performed by: ALLERGY & IMMUNOLOGY

## 2023-12-15 PROCEDURE — 1036F TOBACCO NON-USER: CPT | Performed by: ALLERGY & IMMUNOLOGY

## 2023-12-15 ASSESSMENT — PAIN SCALES - GENERAL: PAINLEVEL: 0-NO PAIN

## 2023-12-15 NOTE — PROGRESS NOTES
Subjective   Patient ID:   [id number]   Mazin Rausch is a 58 y.o. male who presents for Sleep Study (FUV).    Chief Complaint   Patient presents with    Sleep Study     FUV          HPI  This patient is here to evaluate for:  Obstructive Sleep Apnea   Setting up for a home sleep study today    Last time he went to an inlab study.       Review of Systems   All other systems reviewed and are negative.        Objective     /73 (BP Location: Right arm, Patient Position: Sitting, BP Cuff Size: Large adult)   Pulse 69   Ht 1.829 m (6')   Wt 89.8 kg (198 lb)   BMI 26.85 kg/m²      Physical Exam  Vitals and nursing note reviewed.   Constitutional:       Appearance: Normal appearance. He is normal weight.   HENT:      Head: Normocephalic and atraumatic.      Right Ear: External ear normal.      Left Ear: External ear normal.      Nose: No septal deviation, congestion or rhinorrhea.      Right Turbinates: Not enlarged, swollen or pale.      Left Turbinates: Not enlarged, swollen or pale.      Mouth/Throat:      Mouth: Mucous membranes are moist.   Eyes:      Extraocular Movements: Extraocular movements intact.      Conjunctiva/sclera: Conjunctivae normal.      Pupils: Pupils are equal, round, and reactive to light.   Cardiovascular:      Rate and Rhythm: Normal rate and regular rhythm.      Pulses: Normal pulses.      Heart sounds: Normal heart sounds.   Pulmonary:      Effort: Pulmonary effort is normal.      Breath sounds: Normal breath sounds. No wheezing, rhonchi or rales.   Musculoskeletal:         General: Normal range of motion.   Skin:     General: Skin is warm and dry.      Findings: No erythema or rash.   Neurological:      General: No focal deficit present.      Mental Status: He is alert and oriented to person, place, and time.   Psychiatric:         Mood and Affect: Mood normal.         Behavior: Behavior normal.            Current Outpatient Medications   Medication Sig Dispense Refill    aspirin 81  mg EC tablet Take 1 tablet (81 mg) by mouth once daily.      atorvastatin (Lipitor) 40 mg tablet TAKE 1 TABLET BY MOUTH EVERY DAY 90 tablet 3    betamethasone valerate (Valisone) 0.1 % cream APPLY  CREAM TOPICALLY TO AFFECTED AREA TWICE DAILY      lisinopriL-hydrochlorothiazide 10-12.5 mg tablet TAKE 1 TABLET BY MOUTH EVERY DAY 90 tablet 3    mometasone furoate, bulk, 100 % powder Compound Mometasone 1mg capsule to be added to sinus rinse twice daily.      PreviDent 5000 Booster Plus 1.1 % dental paste USE IN PLACE OF TOOTH PASTE      pseudoephedrine (Sudafed) 30 mg tablet Take by mouth every 4 hours.       No current facility-administered medications for this visit.       Summary of the labs over the past 6 months:    Lab on 07/31/2023   Component Date Value Ref Range Status    Glucose 07/31/2023 95  74 - 99 mg/dL Final    Sodium 07/31/2023 139  136 - 145 mmol/L Final    Potassium 07/31/2023 4.4  3.5 - 5.3 mmol/L Final    Chloride 07/31/2023 104  98 - 107 mmol/L Final    Bicarbonate 07/31/2023 29  21 - 32 mmol/L Final    Anion Gap 07/31/2023 10  10 - 20 mmol/L Final    Urea Nitrogen 07/31/2023 14  6 - 23 mg/dL Final    Creatinine 07/31/2023 0.90  0.50 - 1.30 mg/dL Final    GFR MALE 07/31/2023 >90  >90 mL/min/1.73m2 Final    Calcium 07/31/2023 9.1  8.6 - 10.3 mg/dL Final    Albumin 07/31/2023 4.3  3.4 - 5.0 g/dL Final    Alkaline Phosphatase 07/31/2023 69  33 - 120 U/L Final    Total Protein 07/31/2023 6.6  6.4 - 8.2 g/dL Final    AST 07/31/2023 21  9 - 39 U/L Final    Total Bilirubin 07/31/2023 0.7  0.0 - 1.2 mg/dL Final    ALT (SGPT) 07/31/2023 16  10 - 52 U/L Final    WBC 07/31/2023 7.0  4.4 - 11.3 x10E9/L Final    RBC 07/31/2023 5.31  4.50 - 5.90 x10E12/L Final    Hemoglobin 07/31/2023 15.4  13.5 - 17.5 g/dL Final    Hematocrit 07/31/2023 46.6  41.0 - 52.0 % Final    MCV 07/31/2023 88  80 - 100 fL Final    MCHC 07/31/2023 33.0  32.0 - 36.0 g/dL Final    Platelets 07/31/2023 249  150 - 450 x10E9/L Final    RDW  07/31/2023 12.9  11.5 - 14.5 % Final    Cholesterol 07/31/2023 171  0 - 199 mg/dL Final    HDL 07/31/2023 49.1  mg/dL Final    Cholesterol/HDL Ratio 07/31/2023 3.5   Final    LDL 07/31/2023 89  0 - 99 mg/dL Final    VLDL 07/31/2023 33  0 - 40 mg/dL Final    Triglycerides 07/31/2023 167 (H)  0 - 149 mg/dL Final    PSA 07/31/2023 0.40  0.00 - 4.00 ng/mL Final    TSH 07/31/2023 1.32  0.44 - 3.98 mIU/L Final         Assessment/Plan   Diagnoses and all orders for this visit:  Chronic allergic conjunctivitis  Allergic rhinitis due to pollen, unspecified seasonality  Sleep-disordered breathing      The home sleep study test was provided to this patient today. We explained how to use the machine and questions were answered. We will obtain 2 nights of sleep data in order to obtain accurate results. The patient will return the machine and then we will discuss a more long term plan based on the results of the study.    Allergies - see last note    Darryl Rodriguez MD

## 2023-12-18 ENCOUNTER — OFFICE VISIT (OUTPATIENT)
Dept: ALLERGY | Facility: CLINIC | Age: 58
End: 2023-12-18
Payer: COMMERCIAL

## 2023-12-18 DIAGNOSIS — G47.30 SLEEP-DISORDERED BREATHING: Primary | ICD-10-CM

## 2023-12-18 DIAGNOSIS — H10.45 CHRONIC ALLERGIC CONJUNCTIVITIS: ICD-10-CM

## 2023-12-18 DIAGNOSIS — J30.1 ALLERGIC RHINITIS DUE TO POLLEN, UNSPECIFIED SEASONALITY: ICD-10-CM

## 2023-12-18 PROCEDURE — 1036F TOBACCO NON-USER: CPT | Performed by: ALLERGY & IMMUNOLOGY

## 2023-12-18 PROCEDURE — 99214 OFFICE O/P EST MOD 30 MIN: CPT | Performed by: ALLERGY & IMMUNOLOGY

## 2023-12-18 NOTE — PROGRESS NOTES
Subjective   Patient ID:   [id number]   Mazin Rausch is a 58 y.o. male who presents for No chief complaint on file..    No chief complaint on file.  Home sleep study results/ Obstructive Sleep Apnea        HPI  This patient is here to evaluate for:    Snoring.   Sometimes sleeps well, othertimes no  Not waking up from snoring.   During the day, he is fairly rested.     This patient denies any sleepiness while driving or any accidents related to sleepiness. There is no report of nodding at stop lights/signs, crossing center lines, or motor vehicle accidents due to sleepiness.    The sleep quality experienced during the home sleep study was typical. The first and second nights were similar.  Snoring was reported.   1st night, had some congestion and, of note, snoring was a little worse.   No etoh.    Ryaltris- did not help.       Review of Systems   All other systems reviewed and are negative.        Objective     There were no vitals taken for this visit.     Physical Exam  Vitals and nursing note reviewed.   Constitutional:       Appearance: Normal appearance. He is normal weight.   HENT:      Head: Normocephalic and atraumatic.      Right Ear: External ear normal.      Left Ear: External ear normal.      Nose: No septal deviation, congestion or rhinorrhea.      Right Turbinates: Not enlarged, swollen or pale.      Left Turbinates: Not enlarged, swollen or pale.      Mouth/Throat:      Mouth: Mucous membranes are moist.   Eyes:      Extraocular Movements: Extraocular movements intact.      Conjunctiva/sclera: Conjunctivae normal.      Pupils: Pupils are equal, round, and reactive to light.   Cardiovascular:      Rate and Rhythm: Normal rate and regular rhythm.      Pulses: Normal pulses.      Heart sounds: Normal heart sounds.   Pulmonary:      Effort: Pulmonary effort is normal.      Breath sounds: Normal breath sounds. No wheezing, rhonchi or rales.   Musculoskeletal:         General: Normal range of motion.    Skin:     General: Skin is warm and dry.      Findings: No erythema or rash.   Neurological:      General: No focal deficit present.      Mental Status: He is alert and oriented to person, place, and time.   Psychiatric:         Mood and Affect: Mood normal.         Behavior: Behavior normal.            Current Outpatient Medications   Medication Sig Dispense Refill    aspirin 81 mg EC tablet Take 1 tablet (81 mg) by mouth once daily.      atorvastatin (Lipitor) 40 mg tablet TAKE 1 TABLET BY MOUTH EVERY DAY 90 tablet 3    betamethasone valerate (Valisone) 0.1 % cream APPLY  CREAM TOPICALLY TO AFFECTED AREA TWICE DAILY      lisinopriL-hydrochlorothiazide 10-12.5 mg tablet TAKE 1 TABLET BY MOUTH EVERY DAY 90 tablet 3    mometasone furoate, bulk, 100 % powder Compound Mometasone 1mg capsule to be added to sinus rinse twice daily.      PreviDent 5000 Booster Plus 1.1 % dental paste USE IN PLACE OF TOOTH PASTE      pseudoephedrine (Sudafed) 30 mg tablet Take by mouth every 4 hours.       No current facility-administered medications for this visit.       Summary of the labs over the past 6 months:    Lab on 07/31/2023   Component Date Value Ref Range Status    Glucose 07/31/2023 95  74 - 99 mg/dL Final    Sodium 07/31/2023 139  136 - 145 mmol/L Final    Potassium 07/31/2023 4.4  3.5 - 5.3 mmol/L Final    Chloride 07/31/2023 104  98 - 107 mmol/L Final    Bicarbonate 07/31/2023 29  21 - 32 mmol/L Final    Anion Gap 07/31/2023 10  10 - 20 mmol/L Final    Urea Nitrogen 07/31/2023 14  6 - 23 mg/dL Final    Creatinine 07/31/2023 0.90  0.50 - 1.30 mg/dL Final    GFR MALE 07/31/2023 >90  >90 mL/min/1.73m2 Final    Calcium 07/31/2023 9.1  8.6 - 10.3 mg/dL Final    Albumin 07/31/2023 4.3  3.4 - 5.0 g/dL Final    Alkaline Phosphatase 07/31/2023 69  33 - 120 U/L Final    Total Protein 07/31/2023 6.6  6.4 - 8.2 g/dL Final    AST 07/31/2023 21  9 - 39 U/L Final    Total Bilirubin 07/31/2023 0.7  0.0 - 1.2 mg/dL Final    ALT (SGPT)  07/31/2023 16  10 - 52 U/L Final    WBC 07/31/2023 7.0  4.4 - 11.3 x10E9/L Final    RBC 07/31/2023 5.31  4.50 - 5.90 x10E12/L Final    Hemoglobin 07/31/2023 15.4  13.5 - 17.5 g/dL Final    Hematocrit 07/31/2023 46.6  41.0 - 52.0 % Final    MCV 07/31/2023 88  80 - 100 fL Final    MCHC 07/31/2023 33.0  32.0 - 36.0 g/dL Final    Platelets 07/31/2023 249  150 - 450 x10E9/L Final    RDW 07/31/2023 12.9  11.5 - 14.5 % Final    Cholesterol 07/31/2023 171  0 - 199 mg/dL Final    HDL 07/31/2023 49.1  mg/dL Final    Cholesterol/HDL Ratio 07/31/2023 3.5   Final    LDL 07/31/2023 89  0 - 99 mg/dL Final    VLDL 07/31/2023 33  0 - 40 mg/dL Final    Triglycerides 07/31/2023 167 (H)  0 - 149 mg/dL Final    PSA 07/31/2023 0.40  0.00 - 4.00 ng/mL Final    TSH 07/31/2023 1.32  0.44 - 3.98 mIU/L Final         Assessment/Plan   Diagnoses and all orders for this visit:  Sleep-disordered breathing  Allergic rhinitis due to pollen, unspecified seasonality  Chronic allergic conjunctivitis    The home sleep study was excellent with no apneas and only minimal snoring.     Recommend treatment of snoring as clinically indicated. This would include supine preclusion, and treatment of rhinosinusitis.    Recommend treating the allergic rhinitis and conjunctivitis sxs with medicines.     I understand you have been on several nasal sprays. Feel free to try these again      Darryl Rodriguez MD

## 2024-01-02 ENCOUNTER — TELEMEDICINE (OUTPATIENT)
Dept: PRIMARY CARE | Facility: CLINIC | Age: 59
End: 2024-01-02
Payer: COMMERCIAL

## 2024-01-02 DIAGNOSIS — B96.89 BACTERIAL SINUSITIS: Primary | ICD-10-CM

## 2024-01-02 DIAGNOSIS — J32.9 BACTERIAL SINUSITIS: Primary | ICD-10-CM

## 2024-01-02 PROCEDURE — 99212 OFFICE O/P EST SF 10 MIN: CPT | Performed by: NURSE PRACTITIONER

## 2024-01-02 RX ORDER — DOXYCYCLINE HYCLATE 100 MG
100 TABLET ORAL 2 TIMES DAILY
Qty: 20 TABLET | Refills: 0 | Status: SHIPPED | OUTPATIENT
Start: 2024-01-02 | End: 2024-01-12

## 2024-01-02 ASSESSMENT — ENCOUNTER SYMPTOMS
HEADACHES: 1
SINUS COMPLAINT: 1
SINUS PRESSURE: 1

## 2024-01-02 ASSESSMENT — LIFESTYLE VARIABLES: HISTORY_OF_SMOKING: I HAVE NEVER SMOKED

## 2024-01-02 NOTE — PROGRESS NOTES
Subjective   Patient ID: Mazin Rausch is a 58 y.o. male who presents for a Virtual Visit Sinus Problem.  Sinus Problem  This is a new problem. The current episode started in the past 7 days. The problem has been gradually worsening since onset. There has been no fever. The pain is mild. Associated symptoms include ear pain, headaches and sinus pressure. (PND, yellow mucous, facial frontal pressure worse 2 days ago, bad breath  Reports follows with Allergist and ENT also noted in chart for chromic sinus problems) Past treatments include acetaminophen and oral decongestants. The treatment provided mild relief.       Review of Systems   HENT:  Positive for ear pain and sinus pressure.    Neurological:  Positive for headaches.       Physical Exam not performed  E-visit questionnaire reviewed and discussed with patient.   All questions answered    Assessment/Plan   Problem List Items Addressed This Visit    None  Visit Diagnoses         Codes    Bacterial sinusitis    -  Primary J32.9, B96.89    Relevant Medications    doxycycline (Vibra-Tabs) 100 mg tablet          Discussed with patient   Verbalized understanding, Teach back utilized  Recommend follow up with PCP in 2  week

## 2024-01-02 NOTE — PATIENT INSTRUCTIONS
A prescription was sent to your pharmacy. Your pharmacist can answer any questions or concerns you may have or you may call the office.

## 2024-03-11 ENCOUNTER — OFFICE VISIT (OUTPATIENT)
Dept: OTOLARYNGOLOGY | Facility: CLINIC | Age: 59
End: 2024-03-11
Payer: COMMERCIAL

## 2024-03-11 VITALS — WEIGHT: 202 LBS | HEIGHT: 72 IN | BODY MASS INDEX: 27.36 KG/M2

## 2024-03-11 DIAGNOSIS — R09.82 POST-NASAL DRAINAGE: ICD-10-CM

## 2024-03-11 DIAGNOSIS — H93.8X3 EAR FULLNESS, BILATERAL: ICD-10-CM

## 2024-03-11 DIAGNOSIS — R43.8 DECREASED SENSE OF SMELL: ICD-10-CM

## 2024-03-11 DIAGNOSIS — R44.8 FACIAL PRESSURE: Primary | ICD-10-CM

## 2024-03-11 PROCEDURE — 99214 OFFICE O/P EST MOD 30 MIN: CPT | Performed by: OTOLARYNGOLOGY

## 2024-03-11 PROCEDURE — 1036F TOBACCO NON-USER: CPT | Performed by: OTOLARYNGOLOGY

## 2024-03-11 ASSESSMENT — PATIENT HEALTH QUESTIONNAIRE - PHQ9
1. LITTLE INTEREST OR PLEASURE IN DOING THINGS: NOT AT ALL
SUM OF ALL RESPONSES TO PHQ9 QUESTIONS 1 AND 2: 0
2. FEELING DOWN, DEPRESSED OR HOPELESS: NOT AT ALL

## 2024-03-11 NOTE — PROGRESS NOTES
Chief Complaint:  1. Rhinorrhea  2. Facial pressure, headaches  3. Throat clearing, coughing  4. History of septoplasty and turbinate reductions early 2000s   5. Allergic rhinitis with history of IT from [id number]  6. Tinnitus, ear fullness    History Of Present Illness:    Main Symptoms:persistent  Patient does not have anterior nasal drainage.     Patient has  posterior nasal drainage.    Patient does not have nasal airway obstruction.  Patient does not have  facial pain.    Patient has facial pressure in his forehead and temples, sometimes in the back of the head. This symptom is most bothersome.   Patient has decreased sense of smell that is not bothersome. Decreased 65 % of normal.     Will get some improvement in his symptoms with warm showers and Sudafed.  He is trying to decrease the overall amount of Sudafed he is taking in a week.    Associated Symptoms:   Patient has chronic headaches everyday that are usually mild and sometimes occur due to sinus pressure. Patient denies an association to weather, can occur during rainy and yarelis weather.   Patient has throat clearing.    Patient has coughing that he questions may be secondary to his Lisinopril usage.  Patient has mild dysphonia when he lies down.   Patient does not have sneezing.   Patient does not have itchy eyes.   Patient does not have nasal bleeding.    Medications currently on for sinonasal symptoms:  Sudafed 1-2 pills/day 4x week (12x/week in the past)    Unfortunately, Mazin continues with symptoms notably facial pressure and discomfort.  He stopped mometasone rinses about 2 or 3 weeks ago and did not feel that he was getting significant benefit.  He has also used Ryaltris, Flonase, Nasacort, azelastine, Xhance, and ipratropium.  He also mentioned having a bad sinus infection a few years ago and was given Levaquin and either doxycycline or azithromycin and after completion he did well for about 6 months.  He developed another sinus infection and  took similar therapy but did not get this long-term benefit.    Other Pertinent Medical Conditions:   Patient does not have migraines.      Mazin Rausch presents since last being seen 8/2/2023.  Since his last assessment with me, he was evaluated multiple times by Dr. Rodriguez for allergies and also underwent a workup for sleep apnea.  He is utilizing environmental controls at home for his allergies.  He has had previous immunotherapy ending around 2018.    He has been seen by my otology partner, Dr. Peterson, in the past and was told that he may have some fluid in one of his ears.  No specific intervention was performed for this.  He questions whether or not fluid could be causing his current ear pressure.    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    Agatston coronary artery calcium score greater than 400    Coronary artery disease involving native coronary artery of native heart without angina pectoris    Facial pressure    Heart palpitations    Chronic rhinitis    Overweight with body mass index (BMI) of 28 to 28.9 in adult        Past Medical History:  He has a past medical history of Agatston coronary artery calcium score greater than 400 (10/19/2023), Allergic (Penicillin), Cataract, Coronary artery disease involving native coronary artery of native heart without angina pectoris (10/19/2023), Elevated serum gamma-glutamyl transferase level (07/24/2023), Fecal urgency (07/24/2023), HL (hearing loss), Hypertension, IBS (irritable bowel syndrome) (07/24/2023), Personal  history of diseases of the skin and subcutaneous tissue (11/09/2015), Personal history of other diseases of the respiratory system (02/15/2016), and Personal history of other mental and behavioral disorders (04/04/2017).    Surgical History:  He has a past surgical history that includes [place] tooth extraction (2000) and Nasal septum surgery.     Family History:  Family History   Problem Relation Name Age of Onset    Diabetes Mother      Hypertension Mother      Breast cancer Mother      Parkinsonism Father  89    Other (amd) Father         Social History:  He reports that he has never smoked. He has never been exposed to tobacco smoke. He has never used smokeless tobacco. Alcohol use questions deferred to the physician. He reports that he does not use drugs.     Allergies:  Penicillins    Current Meds:    Current Outpatient Medications:     aspirin 81 mg EC tablet, Take 1 tablet (81 mg) by mouth once daily., Disp: , Rfl:     atorvastatin (Lipitor) 40 mg tablet, TAKE 1 TABLET BY MOUTH EVERY DAY, Disp: 90 tablet, Rfl: 3    betamethasone valerate (Valisone) 0.1 % cream, APPLY  CREAM TOPICALLY TO AFFECTED AREA TWICE DAILY, Disp: , Rfl:     lisinopriL-hydrochlorothiazide 10-12.5 mg tablet, TAKE 1 TABLET BY MOUTH EVERY DAY, Disp: 90 tablet, Rfl: 3    mometasone furoate, bulk, 100 % powder, Compound Mometasone 1mg capsule to be added to sinus rinse twice daily., Disp: , Rfl:     PreviDent 5000 Booster Plus 1.1 % dental paste, USE IN PLACE OF TOOTH PASTE, Disp: , Rfl:     pseudoephedrine (Sudafed) 30 mg tablet, Take by mouth every 4 hours., Disp: , Rfl:     Vitals:  Visit Vitals  Smoking Status Never        Physical Exam:  Examination of each ear revealed a normal pinnae, external canal, and tympanic membrane without evidence of effusion or infection.    Results/Data:  I personally reviewed the notes of Darryl Rodriguez MD dated 12/18/23, 12/15/23, 12/7/23, and 10/26/23 with the patient today in clinic.  I also  personally reviewed his CT sinus from June 21, 2023 with the patient today.  This study did not demonstrate significant inflammation within the sinuses.    Provider Impressions:  1. Facial pressure, headaches  2. Postnasal drainage  3. Throat clearing, coughing, dysphonia  4. History of septoplasty and turbinate reductions early 2000s   5. Allergic rhinitis with history of IT from [id number]  6. Tinnitus, ear fullness  7. Decreased sense of smell    Discussion:  Mazin Rausch and I discussed his ongoing sinonasal symptoms.  His previous imaging was normal and he was symptomatic at that time and we had a discussion today that given this, it would be unlikely that his sinuses are the sole  of his ongoing symptoms.  He has used extensive intranasal medical therapy including most recently mometasone rinses.  He certainly could consider other intranasal medical therapies but he has utilized medication from all the major groups so options for him would be substituting mometasone rinses for budesonide rinses or a similar change within a different group.  He was comfortable deferring this therapy in the short-term.    We also discussed that he is not a candidate for further immunotherapy per Dr. Rodriguez and he is doing good environmental controls at his home.    I am suspicious that his symptoms could be related to a primary headache issue rather than a sinus issue given his extensive previous workup and lack of benefit.  I provided him with a consult to be assessed by neurology for this issue specifically.    We also discussed his ongoing ear fullness.  I do not see anything specifically concerning on examination today but I certainly can order repeat tympanogram with or without an audiogram.  At this point in time he was comfortable with observation.    I would like to see him back after his neurology assessment if his symptoms persist.  He was amenable to this and all questions were answered.    Between  face-to-face contact, review of the medical record, and documentation I spent greater than 35 minutes on this visit during the date of service.    Signature:  Scribe Attestation  By signing my name below, I, Alisha Sheth , Scribmike   attest that this documentation has been prepared under the direction and in the presence of Tapan Terrell MD.

## 2024-05-15 ENCOUNTER — OFFICE VISIT (OUTPATIENT)
Dept: OPHTHALMOLOGY | Facility: CLINIC | Age: 59
End: 2024-05-15
Payer: COMMERCIAL

## 2024-05-15 DIAGNOSIS — H35.079: ICD-10-CM

## 2024-05-15 DIAGNOSIS — H43.811 PVD (POSTERIOR VITREOUS DETACHMENT), RIGHT EYE: Primary | ICD-10-CM

## 2024-05-15 PROCEDURE — 99213 OFFICE O/P EST LOW 20 MIN: CPT

## 2024-05-15 ASSESSMENT — ENCOUNTER SYMPTOMS
ALLERGIC/IMMUNOLOGIC NEGATIVE: 0
EYES NEGATIVE: 1
CONSTITUTIONAL NEGATIVE: 0
NEUROLOGICAL NEGATIVE: 0
GASTROINTESTINAL NEGATIVE: 0
RESPIRATORY NEGATIVE: 0
PSYCHIATRIC NEGATIVE: 0
HEMATOLOGIC/LYMPHATIC NEGATIVE: 0
CARDIOVASCULAR NEGATIVE: 0
MUSCULOSKELETAL NEGATIVE: 0
ENDOCRINE NEGATIVE: 0

## 2024-05-15 ASSESSMENT — CUP TO DISC RATIO
OD_RATIO: 0.25
OS_RATIO: 0.25

## 2024-05-15 ASSESSMENT — TONOMETRY
IOP_METHOD: GOLDMANN APPLANATION
OD_IOP_MMHG: 16
OS_IOP_MMHG: 14

## 2024-05-15 ASSESSMENT — VISUAL ACUITY
OS_CC: 20/20
OD_CC: 20/20
METHOD: SNELLEN - LINEAR
OD_CC+: -2

## 2024-05-15 ASSESSMENT — SLIT LAMP EXAM - LIDS
COMMENTS: GOOD POSITION
COMMENTS: GOOD POSITION

## 2024-05-15 ASSESSMENT — EXTERNAL EXAM - RIGHT EYE: OD_EXAM: NORMAL

## 2024-05-15 ASSESSMENT — EXTERNAL EXAM - LEFT EYE: OS_EXAM: NORMAL

## 2024-05-15 NOTE — PROGRESS NOTES
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

## 2024-07-09 ENCOUNTER — APPOINTMENT (OUTPATIENT)
Dept: PRIMARY CARE | Facility: CLINIC | Age: 59
End: 2024-07-09
Payer: COMMERCIAL

## 2024-07-09 ENCOUNTER — LAB (OUTPATIENT)
Dept: LAB | Facility: LAB | Age: 59
End: 2024-07-09
Payer: COMMERCIAL

## 2024-07-09 VITALS
DIASTOLIC BLOOD PRESSURE: 92 MMHG | OXYGEN SATURATION: 98 % | TEMPERATURE: 97.3 F | HEART RATE: 66 BPM | SYSTOLIC BLOOD PRESSURE: 132 MMHG | WEIGHT: 204 LBS | BODY MASS INDEX: 27.67 KG/M2

## 2024-07-09 DIAGNOSIS — M72.2 PLANTAR FASCIITIS: ICD-10-CM

## 2024-07-09 DIAGNOSIS — I10 HYPERTENSION, UNSPECIFIED TYPE: Primary | ICD-10-CM

## 2024-07-09 DIAGNOSIS — E78.5 ELEVATED LIPIDS: ICD-10-CM

## 2024-07-09 DIAGNOSIS — Z12.5 SCREENING FOR PROSTATE CANCER: ICD-10-CM

## 2024-07-09 DIAGNOSIS — I10 HYPERTENSION, UNSPECIFIED TYPE: ICD-10-CM

## 2024-07-09 DIAGNOSIS — I25.10 PRECLINICAL CORONARY ARTERY DISEASE: ICD-10-CM

## 2024-07-09 LAB
ALBUMIN SERPL BCP-MCNC: 4.6 G/DL (ref 3.4–5)
ALP SERPL-CCNC: 83 U/L (ref 33–120)
ALT SERPL W P-5'-P-CCNC: 16 U/L (ref 10–52)
ANION GAP SERPL CALC-SCNC: 10 MMOL/L (ref 10–20)
AST SERPL W P-5'-P-CCNC: 22 U/L (ref 9–39)
BILIRUB SERPL-MCNC: 0.9 MG/DL (ref 0–1.2)
BUN SERPL-MCNC: 9 MG/DL (ref 6–23)
CALCIUM SERPL-MCNC: 9.3 MG/DL (ref 8.6–10.3)
CHLORIDE SERPL-SCNC: 103 MMOL/L (ref 98–107)
CHOLEST SERPL-MCNC: 157 MG/DL (ref 0–199)
CHOLESTEROL/HDL RATIO: 3
CO2 SERPL-SCNC: 31 MMOL/L (ref 21–32)
CREAT SERPL-MCNC: 0.87 MG/DL (ref 0.5–1.3)
EGFRCR SERPLBLD CKD-EPI 2021: >90 ML/MIN/1.73M*2
ERYTHROCYTE [DISTWIDTH] IN BLOOD BY AUTOMATED COUNT: 12.5 % (ref 11.5–14.5)
GLUCOSE SERPL-MCNC: 89 MG/DL (ref 74–99)
HCT VFR BLD AUTO: 47.9 % (ref 41–52)
HDLC SERPL-MCNC: 52.8 MG/DL
HGB BLD-MCNC: 15.9 G/DL (ref 13.5–17.5)
LDLC SERPL CALC-MCNC: 78 MG/DL
MCH RBC QN AUTO: 29.2 PG (ref 26–34)
MCHC RBC AUTO-ENTMCNC: 33.2 G/DL (ref 32–36)
MCV RBC AUTO: 88 FL (ref 80–100)
NON HDL CHOLESTEROL: 104 MG/DL (ref 0–149)
NRBC BLD-RTO: 0 /100 WBCS (ref 0–0)
PLATELET # BLD AUTO: 254 X10*3/UL (ref 150–450)
POTASSIUM SERPL-SCNC: 4 MMOL/L (ref 3.5–5.3)
PROT SERPL-MCNC: 6.8 G/DL (ref 6.4–8.2)
PSA SERPL-MCNC: 0.36 NG/ML
RBC # BLD AUTO: 5.44 X10*6/UL (ref 4.5–5.9)
SODIUM SERPL-SCNC: 140 MMOL/L (ref 136–145)
TRIGL SERPL-MCNC: 130 MG/DL (ref 0–149)
VLDL: 26 MG/DL (ref 0–40)
WBC # BLD AUTO: 6.5 X10*3/UL (ref 4.4–11.3)

## 2024-07-09 PROCEDURE — 84153 ASSAY OF PSA TOTAL: CPT

## 2024-07-09 PROCEDURE — 80053 COMPREHEN METABOLIC PANEL: CPT

## 2024-07-09 PROCEDURE — 1036F TOBACCO NON-USER: CPT | Performed by: NURSE PRACTITIONER

## 2024-07-09 PROCEDURE — 80061 LIPID PANEL: CPT

## 2024-07-09 PROCEDURE — 3075F SYST BP GE 130 - 139MM HG: CPT | Performed by: NURSE PRACTITIONER

## 2024-07-09 PROCEDURE — 99214 OFFICE O/P EST MOD 30 MIN: CPT | Performed by: NURSE PRACTITIONER

## 2024-07-09 PROCEDURE — 3080F DIAST BP >= 90 MM HG: CPT | Performed by: NURSE PRACTITIONER

## 2024-07-09 PROCEDURE — 36415 COLL VENOUS BLD VENIPUNCTURE: CPT

## 2024-07-09 PROCEDURE — 85027 COMPLETE CBC AUTOMATED: CPT

## 2024-07-09 RX ORDER — BUPROPION HYDROCHLORIDE 150 MG/1
TABLET ORAL
COMMUNITY
Start: 2024-05-20

## 2024-07-09 RX ORDER — HYDROCHLOROTHIAZIDE 12.5 MG/1
12.5 TABLET ORAL DAILY
Qty: 90 TABLET | Refills: 1 | Status: SHIPPED | OUTPATIENT
Start: 2024-07-09

## 2024-07-09 RX ORDER — LOSARTAN POTASSIUM 50 MG/1
50 TABLET ORAL DAILY
Qty: 90 TABLET | Refills: 1 | Status: SHIPPED | OUTPATIENT
Start: 2024-07-09

## 2024-07-09 ASSESSMENT — PATIENT HEALTH QUESTIONNAIRE - PHQ9
2. FEELING DOWN, DEPRESSED OR HOPELESS: NOT AT ALL
SUM OF ALL RESPONSES TO PHQ9 QUESTIONS 1 AND 2: 0
1. LITTLE INTEREST OR PLEASURE IN DOING THINGS: NOT AT ALL

## 2024-07-09 ASSESSMENT — ENCOUNTER SYMPTOMS
WEAKNESS: 0
DIARRHEA: 0
FEVER: 0
COUGH: 1
PALPITATIONS: 0
DIZZINESS: 0
ABDOMINAL PAIN: 0
NAUSEA: 1
CHEST TIGHTNESS: 0
HEADACHES: 0
SHORTNESS OF BREATH: 0
VOMITING: 0
NUMBNESS: 0
CHILLS: 0
FATIGUE: 0

## 2024-07-09 NOTE — PROGRESS NOTES
"Subjective    Mazin Rausch is a 59 y.o. male who presents for Med Refill and Medication Question (Pt is wondering if he had a side effect to Lisinopril- he developed a \"cough\" ).    Med Refill  Associated symptoms include coughing and nausea. Pertinent negatives include no abdominal pain, chest pain, chills, fatigue, fever, headaches, numbness, vomiting or weakness.     He presents to the office today for medication refills and follow up.  He is tolerating medication well at current dose- no side effects. ? Cough from Lisinopril- has had cough for 3-4 years. No chest pain, shortness of breath, palpitations or edema. No headaches, numbness, tingling, weakness or vision changes.  No dizziness.  Diet: Overall pretty healthy  Exercise: biking on ebike 2 days a week 4-5  miles on an ebike  Was doing treadmill until developed plantar fasciits\  Reports pain in the heel and bottom of foot when first gets out of bed int he morning or after sitting for a while which improves once up moving.     Colonoscopy due in 4/2027    Last labs: ordered today    Follows with Dr. Jaquelin [facility]- headaches and brain fog.  Laid off in the fall from job- stress level much better.      Review of Systems   Constitutional:  Negative for chills, fatigue and fever.   HENT:  Positive for postnasal drip.    Eyes:  Negative for visual disturbance.   Respiratory:  Positive for cough. Negative for chest tightness and shortness of breath.    Cardiovascular:  Negative for chest pain, palpitations and leg swelling.   Gastrointestinal:  Positive for nausea. Negative for abdominal pain, diarrhea and vomiting.   Neurological:  Negative for dizziness, weakness, numbness and headaches.     Objective   BP (!) 132/92 (BP Location: Left arm, Patient Position: Sitting) Comment: denies SOB, blurry vision and dizziness  Pulse 66   Temp 36.3 °C (97.3 °F) (Temporal)   Wt 92.5 kg (204 lb) Comment: pt reported  SpO2 98%   BMI 27.67 kg/m²     Physical " Exam  Constitutional:       General: He is not in acute distress.     Appearance: Normal appearance. He is not toxic-appearing.   Eyes:      Extraocular Movements: Extraocular movements intact.      Conjunctiva/sclera: Conjunctivae normal.      Pupils: Pupils are equal, round, and reactive to light.   Neck:      Vascular: No carotid bruit.   Cardiovascular:      Rate and Rhythm: Normal rate and regular rhythm.      Pulses: Normal pulses.      Heart sounds: Normal heart sounds, S1 normal and S2 normal. No murmur heard.  Pulmonary:      Effort: Pulmonary effort is normal. No respiratory distress.      Breath sounds: Normal breath sounds.   Abdominal:      General: Bowel sounds are normal.      Palpations: Abdomen is soft.      Tenderness: There is no abdominal tenderness.   Musculoskeletal:      Right lower leg: No edema.      Left lower leg: No edema.   Lymphadenopathy:      Cervical: No cervical adenopathy.   Neurological:      Mental Status: He is alert and oriented to person, place, and time.   Psychiatric:         Attention and Perception: Attention normal.         Mood and Affect: Mood and affect normal.         Behavior: Behavior normal. Behavior is cooperative.         Thought Content: Thought content normal.         Cognition and Memory: Cognition normal.         Judgment: Judgment normal.     Assessment/Plan   Problem List Items Addressed This Visit       Hypertension - Primary     Slightly elevated today. Will switch from Lisinopril to Losartan given cough and monitor.          Relevant Medications    losartan (Cozaar) 50 mg tablet    hydroCHLOROthiazide (Microzide) 12.5 mg tablet    Other Relevant Orders    Comprehensive Metabolic Panel (Completed)    Lipid Panel (Completed)    CBC (Completed)    Elevated lipids     Continue statin at current dose. Check updated labs.         Preclinical coronary artery disease     Clinically stable. Continue ASA and statin.          Plantar fasciitis     Stretches  provided today.          Other Visit Diagnoses       Screening for prostate cancer        Relevant Orders    Prostate Specific Antigen (Completed)            It has been a pleasure seeing you today!

## 2024-07-09 NOTE — PATIENT INSTRUCTIONS
Focus on a low sodium/low fat diet (whole grains, fresh fruits and vegetables, lean meats). Avoid foods high in sugar.  Increase exercise as tolerated.  Stop Lisinopril-hydrochlorothiazide. Start Losartan  along with the hydrochlorothiazide.  Follow up in 6 months or sooner if needed.

## 2024-07-10 ENCOUNTER — APPOINTMENT (OUTPATIENT)
Dept: NEUROLOGY | Facility: CLINIC | Age: 59
End: 2024-07-10
Payer: COMMERCIAL

## 2024-08-15 DIAGNOSIS — E78.5 ELEVATED LIPIDS: ICD-10-CM

## 2024-08-15 DIAGNOSIS — R93.1 AGATSTON CORONARY ARTERY CALCIUM SCORE GREATER THAN 400: ICD-10-CM

## 2024-08-15 DIAGNOSIS — I10 HYPERTENSION, UNSPECIFIED TYPE: ICD-10-CM

## 2024-08-15 DIAGNOSIS — I25.10 CORONARY ARTERY DISEASE INVOLVING NATIVE CORONARY ARTERY OF NATIVE HEART WITHOUT ANGINA PECTORIS: ICD-10-CM

## 2024-08-15 RX ORDER — ATORVASTATIN CALCIUM 40 MG/1
40 TABLET, FILM COATED ORAL DAILY
Qty: 90 TABLET | Refills: 0 | Status: SHIPPED | OUTPATIENT
Start: 2024-08-15

## 2024-11-10 DIAGNOSIS — I10 HYPERTENSION, UNSPECIFIED TYPE: ICD-10-CM

## 2024-11-10 DIAGNOSIS — I25.10 CORONARY ARTERY DISEASE INVOLVING NATIVE CORONARY ARTERY OF NATIVE HEART WITHOUT ANGINA PECTORIS: ICD-10-CM

## 2024-11-10 DIAGNOSIS — E78.5 ELEVATED LIPIDS: ICD-10-CM

## 2024-11-10 DIAGNOSIS — R93.1 AGATSTON CORONARY ARTERY CALCIUM SCORE GREATER THAN 400: ICD-10-CM

## 2024-11-11 RX ORDER — ATORVASTATIN CALCIUM 40 MG/1
40 TABLET, FILM COATED ORAL DAILY
Qty: 90 TABLET | Refills: 0 | OUTPATIENT
Start: 2024-11-11

## 2025-01-13 ENCOUNTER — APPOINTMENT (OUTPATIENT)
Dept: PRIMARY CARE | Facility: CLINIC | Age: 60
End: 2025-01-13
Payer: COMMERCIAL

## 2025-01-13 VITALS
OXYGEN SATURATION: 96 % | HEART RATE: 75 BPM | TEMPERATURE: 98 F | WEIGHT: 217.2 LBS | DIASTOLIC BLOOD PRESSURE: 90 MMHG | BODY MASS INDEX: 29.46 KG/M2 | SYSTOLIC BLOOD PRESSURE: 120 MMHG

## 2025-01-13 DIAGNOSIS — I25.10 PRECLINICAL CORONARY ARTERY DISEASE: ICD-10-CM

## 2025-01-13 DIAGNOSIS — R93.1 AGATSTON CORONARY ARTERY CALCIUM SCORE GREATER THAN 400: ICD-10-CM

## 2025-01-13 DIAGNOSIS — E78.5 ELEVATED LIPIDS: ICD-10-CM

## 2025-01-13 DIAGNOSIS — I25.10 CORONARY ARTERY DISEASE INVOLVING NATIVE CORONARY ARTERY OF NATIVE HEART WITHOUT ANGINA PECTORIS: ICD-10-CM

## 2025-01-13 DIAGNOSIS — I10 HYPERTENSION, UNSPECIFIED TYPE: Primary | ICD-10-CM

## 2025-01-13 PROCEDURE — 1036F TOBACCO NON-USER: CPT | Performed by: NURSE PRACTITIONER

## 2025-01-13 PROCEDURE — 3074F SYST BP LT 130 MM HG: CPT | Performed by: NURSE PRACTITIONER

## 2025-01-13 PROCEDURE — 99214 OFFICE O/P EST MOD 30 MIN: CPT | Performed by: NURSE PRACTITIONER

## 2025-01-13 PROCEDURE — 3080F DIAST BP >= 90 MM HG: CPT | Performed by: NURSE PRACTITIONER

## 2025-01-13 RX ORDER — ATORVASTATIN CALCIUM 40 MG/1
40 TABLET, FILM COATED ORAL DAILY
Qty: 90 TABLET | Refills: 1 | Status: SHIPPED | OUTPATIENT
Start: 2025-01-13

## 2025-01-13 RX ORDER — LOSARTAN POTASSIUM AND HYDROCHLOROTHIAZIDE 12.5; 5 MG/1; MG/1
1 TABLET ORAL DAILY
Qty: 90 TABLET | Refills: 1 | Status: SHIPPED | OUTPATIENT
Start: 2025-01-13

## 2025-01-13 ASSESSMENT — ENCOUNTER SYMPTOMS
FATIGUE: 0
VOMITING: 0
FEVER: 0
CHILLS: 0
HEADACHES: 0
NUMBNESS: 0
COUGH: 0
SHORTNESS OF BREATH: 0
PALPITATIONS: 0
WEAKNESS: 0
DIARRHEA: 0
NAUSEA: 0
ABDOMINAL PAIN: 0
DIZZINESS: 0
CHEST TIGHTNESS: 0

## 2025-01-13 ASSESSMENT — PATIENT HEALTH QUESTIONNAIRE - PHQ9
2. FEELING DOWN, DEPRESSED OR HOPELESS: NOT AT ALL
1. LITTLE INTEREST OR PLEASURE IN DOING THINGS: NOT AT ALL
SUM OF ALL RESPONSES TO PHQ9 QUESTIONS 1 AND 2: 0

## 2025-01-13 NOTE — ASSESSMENT & PLAN NOTE
Goal LDL <70. LDL was 78 in 7/2024. Will recheck in summer once back to work for 6 months and increase Atorvastatin to 80 mg daily if not at goal.

## 2025-01-13 NOTE — PATIENT INSTRUCTIONS
Focus on a low sodium/low fat diet (whole grains, fresh fruits and vegetables, lean meats). Avoid foods high in sugar.  Increase exercise as tolerated.  Continue medications at the current dose.  Follow up in 6 months for a physical.

## 2025-01-13 NOTE — PROGRESS NOTES
"Subjective   Mazin Rausch is a 59 y.o. male who presents for 6 mon fu.    HPI  He presents to the office today for a blood pressure follow up.   He switched the Lisinopril to Losartan at last visit. Cough is much better.  He is tolerating medications well at current dose- no side effects other than urinating a little more. No chest pain, shortness of breath, or edema. Very rare palpitations.   No headaches, numbness, tingling, weakness or vision changes.  No dizziness.  Home blood pressure readings: 120's/80's  Last eye exam: about a year ago   Diet: Overall pretty good. Less snacking since back to work.  Exercise: Treadmill 2-3 days a week- fast walk about 10 minutes  Weight: up since last visit  Anticipates this will improve now back to work-eating less since not at home.  Heis back to work now.  Started back about a month ago.   Colonoscopy due in 4/2027.    Last labs: Reviewed labs from 7/2024 (CMP, CBC, lipid and PSA).  Lab Results   Component Value Date    PSA 0.36 07/09/2024    PSA 0.40 07/31/2023    PSA 0.43 06/07/2022      Lab Results   Component Value Date    WBC 6.5 07/09/2024    HGB 15.9 07/09/2024    HCT 47.9 07/09/2024    MCV 88 07/09/2024     07/09/2024      Lab Results   Component Value Date    GLUCOSE 89 07/09/2024    CALCIUM 9.3 07/09/2024     07/09/2024    K 4.0 07/09/2024    CO2 31 07/09/2024     07/09/2024    BUN 9 07/09/2024    CREATININE 0.87 07/09/2024      Lab Results   Component Value Date    CHOL 157 07/09/2024    CHOL 171 07/31/2023    CHOL 186 06/07/2022     Lab Results   Component Value Date    HDL 52.8 07/09/2024    HDL 49.1 07/31/2023    HDL 49.6 06/07/2022     Lab Results   Component Value Date    LDLCALC 78 07/09/2024     Lab Results   Component Value Date    TRIG 130 07/09/2024    TRIG 167 (H) 07/31/2023    TRIG 161 (H) 06/07/2022     No components found for: \"CHOLHDL\"     Review of Systems   Constitutional:  Negative for chills, fatigue and fever.   Eyes:  " Negative for visual disturbance.   Respiratory:  Negative for cough, chest tightness and shortness of breath.    Cardiovascular:  Negative for chest pain, palpitations and leg swelling.   Gastrointestinal:  Negative for abdominal pain, diarrhea, nausea and vomiting.   Neurological:  Negative for dizziness, weakness, numbness and headaches.       Objective   /90 (BP Location: Left arm, Patient Position: Sitting)   Pulse 75   Temp 36.7 °C (98 °F) (Temporal)   Wt 98.5 kg (217 lb 3.2 oz)   SpO2 96%   BMI 29.46 kg/m²     Physical Exam  Constitutional:       General: He is not in acute distress.     Appearance: Normal appearance. He is not toxic-appearing.   Eyes:      Extraocular Movements: Extraocular movements intact.      Conjunctiva/sclera: Conjunctivae normal.      Pupils: Pupils are equal, round, and reactive to light.   Cardiovascular:      Rate and Rhythm: Normal rate and regular rhythm.      Pulses: Normal pulses.      Heart sounds: Normal heart sounds, S1 normal and S2 normal. No murmur heard.  Pulmonary:      Effort: Pulmonary effort is normal. No respiratory distress.      Breath sounds: Normal breath sounds.   Abdominal:      General: Bowel sounds are normal.      Palpations: Abdomen is soft.      Tenderness: There is no abdominal tenderness.   Musculoskeletal:      Right lower leg: No edema.      Left lower leg: No edema.   Lymphadenopathy:      Cervical: No cervical adenopathy.   Neurological:      Mental Status: He is alert and oriented to person, place, and time.   Psychiatric:         Attention and Perception: Attention normal.         Mood and Affect: Mood and affect normal.         Behavior: Behavior normal. Behavior is cooperative.         Thought Content: Thought content normal.         Cognition and Memory: Cognition normal.         Judgment: Judgment normal.         Assessment/Plan   Problem List Items Addressed This Visit       Hypertension - Primary     Fair control on current  medications. Would like switched to the combination pill.          Relevant Medications    losartan-hydrochlorothiazide (Hyzaar) 50-12.5 mg tablet    Elevated lipids     Goal LDL <70. LDL was 78 in 7/2024. Will recheck in summer once back to work for 6 months and increase Atorvastatin to 80 mg daily if not at goal.         Relevant Medications    atorvastatin (Lipitor) 40 mg tablet    Preclinical coronary artery disease     Clinically stable. Continue ASA and statin.         Agatston coronary artery calcium score greater than 400    Relevant Medications    atorvastatin (Lipitor) 40 mg tablet    Coronary artery disease involving native coronary artery of native heart without angina pectoris    Relevant Medications    atorvastatin (Lipitor) 40 mg tablet     It has been a pleasure seeing you today!

## 2025-03-14 ENCOUNTER — OFFICE VISIT (OUTPATIENT)
Dept: URGENT CARE | Age: 60
End: 2025-03-14
Payer: COMMERCIAL

## 2025-03-14 DIAGNOSIS — J01.00 ACUTE NON-RECURRENT MAXILLARY SINUSITIS: Primary | ICD-10-CM

## 2025-03-14 RX ORDER — DOXYCYCLINE 100 MG/1
100 CAPSULE ORAL 2 TIMES DAILY
Qty: 20 CAPSULE | Refills: 0 | Status: SHIPPED | OUTPATIENT
Start: 2025-03-14 | End: 2025-03-24

## 2025-03-14 ASSESSMENT — ENCOUNTER SYMPTOMS
SINUS PRESSURE: 1
GASTROINTESTINAL NEGATIVE: 1
RHINORRHEA: 1
EYES NEGATIVE: 1
NEUROLOGICAL NEGATIVE: 1
MUSCULOSKELETAL NEGATIVE: 1
HEMATOLOGIC/LYMPHATIC NEGATIVE: 1
PSYCHIATRIC NEGATIVE: 1
CONSTITUTIONAL NEGATIVE: 1
ALLERGIC/IMMUNOLOGIC NEGATIVE: 1
SINUS PAIN: 1
ENDOCRINE NEGATIVE: 1
RESPIRATORY NEGATIVE: 1
CARDIOVASCULAR NEGATIVE: 1

## 2025-03-14 NOTE — PROGRESS NOTES
Urgent Care Virtual Video Visit    Patient Location: [facility]   Patient ID: Mazin Rausch is a 59 y.o. male. They present today with a chief complaint of No chief complaint on file..    History of Present Illness  Patient is a 60 y/o male presenting for virtual evaluation with nasal congestion/drainage, sinus pressure/pain x3 weeks. Patient denies symptoms of fever, chills, bodyaches, lethargy, weakness, chest pain/tightness/pressure, SOB, wheezing, N/V/D, ABD pain. OTC Sudafed taken with little relief of symptoms.            Past Medical History  Allergies as of 03/14/2025 - Reviewed 03/14/2025   Allergen Reaction Noted    Penicillins Unknown 07/24/2023       (Not in a hospital admission)       Past Medical History:   Diagnosis Date    Agatston coronary artery calcium score greater than 400 10/19/2023        Allergic Penicillin    Cataract     Coronary artery disease involving native coronary artery of native heart without angina pectoris 10/19/2023    Elevated serum gamma-glutamyl transferase level 07/24/2023    Fecal urgency 07/24/2023    HL (hearing loss)     Hypertension     IBS (irritable bowel syndrome) 07/24/2023    Personal history of diseases of the skin and subcutaneous tissue 11/09/2015    History of dermatitis    Personal history of other diseases of the respiratory system 02/15/2016    History of acute bronchitis    Personal history of other mental and behavioral disorders 04/04/2017    History of attention deficit disorder       Past Surgical History:   Procedure Laterality Date    NASAL SEPTUM SURGERY      WISDOM TOOTH EXTRACTION  2000        reports that he has never smoked. He has never been exposed to tobacco smoke. He has never used smokeless tobacco. He reports that he does not currently use alcohol. He reports that he does not use drugs.    Review of Systems  Review of Systems   Constitutional: Negative.    HENT:  Positive for congestion, rhinorrhea, sinus pressure and sinus  pain.    Eyes: Negative.    Respiratory: Negative.     Cardiovascular: Negative.    Gastrointestinal: Negative.    Endocrine: Negative.    Genitourinary: Negative.    Musculoskeletal: Negative.    Skin: Negative.    Allergic/Immunologic: Negative.    Neurological: Negative.    Hematological: Negative.    Psychiatric/Behavioral: Negative.                                    Objective    There were no vitals filed for this visit.  No LMP for male patient.    Physical Exam  Constitutional:       Comments: Patient A/O x4, LOC 5, calm and cooperative. Patient is nasally congested during virtual examination but is otherwise well-appearing. Patient answering questions/following commands appropriately during virtual evaluation. Patient in no acute distress   HENT:      Head: Normocephalic and atraumatic.      Nose:      Comments: Patient is nasally congested. Bilateral nares with erythema; patient frequently sniffling.      Mouth/Throat:      Mouth: Mucous membranes are moist.   Eyes:      Extraocular Movements: Extraocular movements intact.      Conjunctiva/sclera: Conjunctivae normal.      Pupils: Pupils are equal, round, and reactive to light.   Pulmonary:      Comments: No audible cough during examination. Patient speaking in complete sentences without SOB or difficulty. Patient in no acute respiratory distress   Musculoskeletal:      Cervical back: Neck supple.   Skin:     General: Skin is dry.   Neurological:      General: No focal deficit present.      Mental Status: He is oriented to person, place, and time.   Psychiatric:         Mood and Affect: Mood normal.         Behavior: Behavior normal.      Comments: Remainder of evaluation unattainable d/t virtual nature of examination.          Procedures    Point of Care Test & Imaging Results from this visit  No results found for this visit on 03/14/25.   No results found.    Diagnostic study results (if any) were reviewed by ARAM Bills.    Assessment/Plan  "  Allergies, medications, history, and pertinent labs/EKGs/Imaging reviewed by KIRSTIE Bills-CNP.     Medical Decision Making  -Patient presenting to virtual evaluation for c/o nasal congestion/drainage, sinus pressure/pain x3 weeks after URI  -Will treat for suspected maxillary sinusitis with Doxycycline, as patient has allergy to PCN  -Patient requesting \"two rounds of Zpak\"; advised patient Doxycycline will be sent and to follow up with PCP if symptoms persist  -Discussed limitations of virtual evaluations along with red-flag s/s to which would indicate immediate ED evaluation  -Stable upon discharge    I have communicated my name and active licensure information to the patient. The patient's identity and physical location were verified at the time of this visit. Either the patient or their legal representative were informed of the risks and benefits of, and alternatives to, treatment through a remote evaluation. The patient consented to proceed with the evaluation remotely. This remote visit was conducted using video and audio.     At time of discharge, patient was clinically well-appearing and appropriate for outpatient management. The patient/parent/guardian was educated regarding diagnosis, supportive care, OTC and Rx medications. The patient/parent/guardian was given the opportunity to ask questions prior to discharge. They verbalized understanding of discussion of treatment plan, expected course of illness and/or injury, indications on when to return to , when to seek further evaluation in ED/call 911, and the need to follow up with PCP and/or specialist as referred. Patient/parent/guardian was provided with work/school documentation if requested. Patient stable upon discharge.     Orders and Diagnoses  Diagnoses and all orders for this visit:  Acute non-recurrent maxillary sinusitis  -     doxycycline (Vibramycin) 100 mg capsule; Take 1 capsule (100 mg) by mouth 2 times a day for 10 days. Take with " food      Medical Admin Record      Patient disposition: Home    Electronically signed by ARAM Bills  12:27 PM      Provider Location: [facility]    Video visit completed with realtime synchronous video/audio connection. Informed consent was obtained from the patient. Patient was made aware that my evaluation and diagnosis are limited due to the fact that we are not in the same room during the interview and that this is a virtual encounter that took place via videoconferencing. Patient verbalized understanding.     Patient disposition: Home    Electronically signed by ARAM Bills  12:27 PM

## 2025-03-18 ENCOUNTER — OFFICE VISIT (OUTPATIENT)
Dept: CARDIOLOGY | Facility: CLINIC | Age: 60
End: 2025-03-18
Payer: COMMERCIAL

## 2025-03-18 VITALS
BODY MASS INDEX: 29.66 KG/M2 | HEART RATE: 85 BPM | TEMPERATURE: 98 F | WEIGHT: 219 LBS | DIASTOLIC BLOOD PRESSURE: 79 MMHG | SYSTOLIC BLOOD PRESSURE: 125 MMHG | HEIGHT: 72 IN

## 2025-03-18 DIAGNOSIS — I10 PRIMARY HYPERTENSION: ICD-10-CM

## 2025-03-18 DIAGNOSIS — I25.10 CORONARY ARTERY DISEASE INVOLVING NATIVE CORONARY ARTERY OF NATIVE HEART WITHOUT ANGINA PECTORIS: Primary | ICD-10-CM

## 2025-03-18 DIAGNOSIS — E78.5 ELEVATED LIPIDS: ICD-10-CM

## 2025-03-18 LAB
ATRIAL RATE: 94 BPM
P AXIS: 55 DEGREES
P OFFSET: 200 MS
P ONSET: 146 MS
PR INTERVAL: 148 MS
Q ONSET: 220 MS
QRS COUNT: 16 BEATS
QRS DURATION: 86 MS
QT INTERVAL: 348 MS
QTC CALCULATION(BAZETT): 435 MS
QTC FREDERICIA: 404 MS
R AXIS: 39 DEGREES
T AXIS: 74 DEGREES
T OFFSET: 394 MS
VENTRICULAR RATE: 94 BPM

## 2025-03-18 PROCEDURE — 3008F BODY MASS INDEX DOCD: CPT | Performed by: INTERNAL MEDICINE

## 2025-03-18 PROCEDURE — 99214 OFFICE O/P EST MOD 30 MIN: CPT | Performed by: INTERNAL MEDICINE

## 2025-03-18 PROCEDURE — 3078F DIAST BP <80 MM HG: CPT | Performed by: INTERNAL MEDICINE

## 2025-03-18 PROCEDURE — 1036F TOBACCO NON-USER: CPT | Performed by: INTERNAL MEDICINE

## 2025-03-18 PROCEDURE — 3074F SYST BP LT 130 MM HG: CPT | Performed by: INTERNAL MEDICINE

## 2025-03-18 PROCEDURE — 93005 ELECTROCARDIOGRAM TRACING: CPT | Performed by: INTERNAL MEDICINE

## 2025-03-18 NOTE — PROGRESS NOTES
Chief Complaint:   Coronary Artery Disease     History Of Present Illness:    Mazin Rausch is a 59 y.o. male presenting with for evaluation of preclinical coronary artery disease detected by coronary artery calcium scoring (862 on 10/4/23).   The patient is tolerating guideline-directed medical therapy with antiplatelet and statin medication and is compliant.  The patient exercises regularly and follows a heart healthy diet.  The patient has been well and is not having any anginal symptoms or dyspnea on exertion.      Review of Systems  All pertinent systems have been reviewed and are negative except for what is stated in the history of present illness.    All other systems have been reviewed and are negative and noncontributory to this patient's current ailments.  .     Last Recorded Vitals:  Visit Vitals  /79   Pulse 85   Temp 36.7 °C (98 °F)   Ht 1.829 m (6')   Wt 99.3 kg (219 lb)   BMI 29.70 kg/m²   Smoking Status Never   BSA 2.25 m²        Past Medical History:  He has a past medical history of Agatston coronary artery calcium score greater than 400 (10/19/2023), Allergic (Penicillin), Cataract, Coronary artery disease involving native coronary artery of native heart without angina pectoris (10/19/2023), Elevated serum gamma-glutamyl transferase level (07/24/2023), Fecal urgency (07/24/2023), HL (hearing loss), Hypertension, IBS (irritable bowel syndrome) (07/24/2023), Personal history of diseases of the skin and subcutaneous tissue (11/09/2015), Personal history of other diseases of the respiratory system (02/15/2016), and Personal history of other mental and behavioral disorders (04/04/2017).    Past Surgical History:  He has a past surgical history that includes Agar tooth extraction (2000) and Nasal septum surgery.      Social History:  He reports that he has never smoked. He has never been exposed to tobacco smoke. He has never used smokeless tobacco. He reports that he does not currently use  alcohol. He reports that he does not use drugs.    Family History:  Family History   Problem Relation Name Age of Onset    Diabetes Mother      Hypertension Mother      Breast cancer Mother      Parkinsonism Father  89    Other (amd) Father          Allergies:  Penicillins    Outpatient Medications:  Current Outpatient Medications   Medication Instructions    aspirin 81 mg, Daily    atorvastatin (LIPITOR) 40 mg, oral, Daily    buPROPion XL (Wellbutrin XL) 150 mg 24 hr tablet TAKE 1 TABLET BY MOUTH EVERY DAY IN THE MORNING FOR 30 DAYS    cholecalciferol, vitamin D3, (VITAMIN D3 ORAL) 2,000 Units, Daily    doxycycline (VIBRAMYCIN) 100 mg, oral, 2 times daily, Take with food    losartan-hydrochlorothiazide (Hyzaar) 50-12.5 mg tablet 1 tablet, oral, Daily    PreviDent 5000 Booster Plus 1.1 % dental paste USE IN PLACE OF TOOTH PASTE    pseudoephedrine (Sudafed) 30 mg tablet As needed       Physical Exam:  Physical Exam  Vitals reviewed.   Constitutional:       General: He is not in acute distress.     Appearance: Normal appearance.   HENT:      Head: Normocephalic and atraumatic.      Nose: Nose normal.   Eyes:      Conjunctiva/sclera: Conjunctivae normal.   Cardiovascular:      Rate and Rhythm: Normal rate and regular rhythm.      Pulses: Normal pulses.      Heart sounds: No murmur heard.  Pulmonary:      Effort: Pulmonary effort is normal. No respiratory distress.      Breath sounds: Normal breath sounds. No wheezing, rhonchi or rales.   Abdominal:      General: Bowel sounds are normal. There is no distension.      Palpations: Abdomen is soft.      Tenderness: There is no abdominal tenderness.   Musculoskeletal:         General: No swelling.      Right lower leg: No edema.      Left lower leg: No edema.   Skin:     General: Skin is warm and dry.      Capillary Refill: Capillary refill takes less than 2 seconds.   Neurological:      General: No focal deficit present.      Mental Status: He is alert.   Psychiatric:        "  Mood and Affect: Mood normal.           Last Labs:  CBC -  Lab Results   Component Value Date    WBC 6.5 07/09/2024    HGB 15.9 07/09/2024    HCT 47.9 07/09/2024    MCV 88 07/09/2024     07/09/2024       CMP -  Lab Results   Component Value Date    CALCIUM 9.3 07/09/2024    PROT 6.8 07/09/2024    ALBUMIN 4.6 07/09/2024    AST 22 07/09/2024    ALT 16 07/09/2024    ALKPHOS 83 07/09/2024    BILITOT 0.9 07/09/2024       LIPID PANEL -   Lab Results   Component Value Date    CHOL 157 07/09/2024    HDL 52.8 07/09/2024    CHHDL 3.0 07/09/2024    VLDL 26 07/09/2024    TRIG 130 07/09/2024    NHDL 104 07/09/2024       RENAL FUNCTION PANEL -   Lab Results   Component Value Date    K 4.0 07/09/2024       No results found for: \"BNP\", \"HGBA1C\"    Last Cardiology Tests:  Reviewed ECG normal and Labs  No results found for this or any previous visit from the past 1095 days.    Echo:  No echocardiogram results found for the past 12 months     Narrative & Impression   Interpreted By:  Taylor Mendoza,   STUDY:  CT CARDIAC SCORING WO IV CONTRAST;  10/4/2023 12:56 pm      INDICATION:  Signs/Symptoms: preclinical CAD.      COMPARISON:  None.      ACCESSION NUMBER(S):  [id number]      ORDERING CLINICIAN:  CHRISTA PARHAM      TECHNIQUE:  Using prospective ECG gating, CT scan of the coronary arteries was  performed without intravenous contrast. Coronary calcium scoring  was  performed according to the method of Agatston.      FINDINGS:  The score and distribution of calcium in the coronary arteries is as  follows:      .32,  .45,  LCx 205.49,  RCA 6.87,      Total   862.13      The visualized mid/lower ascending thoracic aorta measures 3.7 cm in  diameter. The heart is normal in size. No pericardial effusion is  present.      No gross evidence of mediastinal or hilar lymphadenopathy or masses  is identified. The visualized segments of the lungs are normally  expanded.      The visualized subdiaphragmatic " structures appear intact.      IMPRESSION:  1. Coronary artery calcium score of 862.13*.           Assessment/Plan       1. Hypertension, unspecified type  Hypertension: The patient's blood pressure has been well-controlled at today's appointment or by recent primary care provider's measurements/home measurements and meets their goal blood pressure per the ACC/AHA guidelines.  The patient has been compliant with their anti-hypertensive medications and is following a low sodium/DASH diet and performs regular exercise.  I advised continuation of their present medical treatment and lifestyle modification.      2. Elevated lipids  The patient's lipids are well controlled on chronic atorvastatin therapy and they are meeting their goal LDL cholesterol per the ACC/AHA guidelines.      3. Coronary artery disease involving native coronary artery of native heart without angina pectoris  CAD: The patient's CAD, as detailed in the HPI, has been clinically stable, without any anginal symptoms or dyspnea.  The patient will continue treatment with guideline-directed medical therapy with ASA and statin medications and will continue regular exercise and a heart healthy diet.         4. Agatston coronary artery calcium score greater than 400  As above.     Mazin Rausch will return in 1 year for an office visit with Stress echo.     José Miguel Shields MD    Exclusive of any other services or procedures performed, I, José Miguel Shields MD , spent 30 minutes in duration for this visit today.  This time consisted of chart review, obtaining history, and/or performing the exam as documented above as well as documenting the clinical information for the encounter in the electronic record, discussing treatment options, plans, and/or goals with patient, family, and/or caregiver, refilling medications, updating the electronic record, ordering medicines, lab work, imaging, referrals, and/or procedures as documented above and communicating with other  [facility] professionals. I have discussed the results of laboratory, radiology, and cardiology studies with the patient and their family/caregiver.

## 2025-06-09 ENCOUNTER — OFFICE VISIT (OUTPATIENT)
Dept: URGENT CARE | Age: 60
End: 2025-06-09
Payer: COMMERCIAL

## 2025-06-09 VITALS
TEMPERATURE: 97.8 F | SYSTOLIC BLOOD PRESSURE: 147 MMHG | HEART RATE: 90 BPM | RESPIRATION RATE: 18 BRPM | DIASTOLIC BLOOD PRESSURE: 81 MMHG | OXYGEN SATURATION: 96 %

## 2025-06-09 DIAGNOSIS — M54.31 SCIATICA OF RIGHT SIDE: Primary | ICD-10-CM

## 2025-06-09 PROCEDURE — 3079F DIAST BP 80-89 MM HG: CPT | Performed by: NURSE PRACTITIONER

## 2025-06-09 PROCEDURE — 99213 OFFICE O/P EST LOW 20 MIN: CPT | Performed by: NURSE PRACTITIONER

## 2025-06-09 PROCEDURE — 3077F SYST BP >= 140 MM HG: CPT | Performed by: NURSE PRACTITIONER

## 2025-06-09 RX ORDER — NAPROXEN 500 MG/1
500 TABLET ORAL
Qty: 20 TABLET | Refills: 0 | Status: SHIPPED | OUTPATIENT
Start: 2025-06-09 | End: 2025-06-19

## 2025-06-09 RX ORDER — METHYLPREDNISOLONE 4 MG/1
TABLET ORAL
Qty: 21 TABLET | Refills: 0 | Status: SHIPPED | OUTPATIENT
Start: 2025-06-09

## 2025-06-09 RX ORDER — CYCLOBENZAPRINE HCL 10 MG
10 TABLET ORAL 3 TIMES DAILY
Qty: 21 TABLET | Refills: 0 | Status: SHIPPED | OUTPATIENT
Start: 2025-06-09 | End: 2025-06-16

## 2025-06-09 NOTE — PROGRESS NOTES
Subjective   Patient ID: Mazin Rausch is a 60 y.o. male. They present today with a chief complaint of Back Pain.    History of Present Illness  Patient presents with the complaint of right sciatica. States he does have some pulling down to his calf. States the other day he was moving a treadmill and he was down on his knees when he felt the polling. States he feels better standing. Increased pain when he's sitting. Denies numbness and tingling.     Past Medical History  Allergies as of 06/09/2025 - Reviewed 06/09/2025   Allergen Reaction Noted    Penicillins Unknown 07/24/2023       Prescriptions Prior to Admission[1]     Medical History[2]    Surgical History[3]     reports that he has never smoked. He has never been exposed to tobacco smoke. He has never used smokeless tobacco. He reports that he does not currently use alcohol. He reports that he does not use drugs.    Review of Systems  Review of Systems     See HPI                          Objective    Vitals:    06/09/25 1455   BP: (!) 158/98   Pulse: 90   Resp: 18   Temp: 36.6 °C (97.8 °F)   TempSrc: Oral   SpO2: 96%     No LMP for male patient.    Physical Exam  Constitutional:       Appearance: Normal appearance.   HENT:      Head: Normocephalic and atraumatic.   Cardiovascular:      Rate and Rhythm: Normal rate and regular rhythm.      Pulses: Normal pulses.      Heart sounds: Normal heart sounds.   Pulmonary:      Effort: Pulmonary effort is normal.      Breath sounds: Normal breath sounds.   Abdominal:      General: Abdomen is flat. Bowel sounds are normal.      Palpations: Abdomen is soft.     Procedures    Point of Care Test & Imaging Results from this visit  No results found for this visit on 06/09/25.   Imaging  No results found.    Cardiology, Vascular, and Other Imaging  No other imaging results found for the past 2 days      Diagnostic study results (if any) were reviewed by KIRSTIE Solano-CNP.    Assessment/Plan   Allergies, medications,  history, and pertinent labs/EKGs/Imaging reviewed by ARAM Solano.     Medical Decision Making  At time of discharge patient was clinically well-appearing and HDS for outpatient management. The patient and/or family was educated regarding diagnosis, supportive care, OTC and Rx medications. The patient and/or family was given the opportunity to ask questions prior to discharge.  They verbalized understanding of my discussion of the plans for treatment, expected course, indications to return to  or seek further evaluation in ED, and the need for timely follow up as directed.   They were provided with a work/school excuse if requested.      Orders and Diagnoses  Diagnoses and all orders for this visit:  Sciatica of right side  -     naproxen (Naprosyn) 500 mg tablet; Take 1 tablet (500 mg) by mouth 2 times daily (morning and late afternoon) for 10 days.  -     methylPREDNISolone (Medrol Dospak) 4 mg tablets; Follow schedule on package instructions  -     cyclobenzaprine (Flexeril) 10 mg tablet; Take 1 tablet (10 mg) by mouth 3 times a day for 7 days.      Medical Admin Record      Patient disposition: Home    Electronically signed by ARAM Solano  3:00 PM           [1] (Not in a hospital admission)   [2]   Past Medical History:  Diagnosis Date    Agatston coronary artery calcium score greater than 400 10/19/2023        Allergic Penicillin    Cataract     Coronary artery disease involving native coronary artery of native heart without angina pectoris 10/19/2023    Elevated serum gamma-glutamyl transferase level 07/24/2023    Fecal urgency 07/24/2023    HL (hearing loss)     Hypertension     IBS (irritable bowel syndrome) 07/24/2023    Personal history of diseases of the skin and subcutaneous tissue 11/09/2015    History of dermatitis    Personal history of other diseases of the respiratory system 02/15/2016    History of acute bronchitis    Personal history of other mental and behavioral  disorders 04/04/2017    History of attention deficit disorder   [3]   Past Surgical History:  Procedure Laterality Date    NASAL SEPTUM SURGERY      WISDOM TOOTH EXTRACTION  2000

## 2025-06-10 ENCOUNTER — TELEPHONE (OUTPATIENT)
Dept: URGENT CARE | Age: 60
End: 2025-06-10

## 2025-06-27 ENCOUNTER — OFFICE VISIT (OUTPATIENT)
Dept: PRIMARY CARE | Facility: CLINIC | Age: 60
End: 2025-06-27
Payer: COMMERCIAL

## 2025-06-27 VITALS
HEART RATE: 82 BPM | DIASTOLIC BLOOD PRESSURE: 81 MMHG | TEMPERATURE: 97.9 F | SYSTOLIC BLOOD PRESSURE: 123 MMHG | BODY MASS INDEX: 29.02 KG/M2 | OXYGEN SATURATION: 96 % | WEIGHT: 214 LBS

## 2025-06-27 DIAGNOSIS — M54.31 RIGHT SIDED SCIATICA: Primary | ICD-10-CM

## 2025-06-27 PROCEDURE — 3079F DIAST BP 80-89 MM HG: CPT | Performed by: STUDENT IN AN ORGANIZED HEALTH CARE EDUCATION/TRAINING PROGRAM

## 2025-06-27 PROCEDURE — 3074F SYST BP LT 130 MM HG: CPT | Performed by: STUDENT IN AN ORGANIZED HEALTH CARE EDUCATION/TRAINING PROGRAM

## 2025-06-27 PROCEDURE — 1036F TOBACCO NON-USER: CPT | Performed by: STUDENT IN AN ORGANIZED HEALTH CARE EDUCATION/TRAINING PROGRAM

## 2025-06-27 PROCEDURE — 99213 OFFICE O/P EST LOW 20 MIN: CPT | Performed by: STUDENT IN AN ORGANIZED HEALTH CARE EDUCATION/TRAINING PROGRAM

## 2025-06-27 RX ORDER — PREDNISONE 10 MG/1
TABLET ORAL
Qty: 30 TABLET | Refills: 0 | Status: SHIPPED | OUTPATIENT
Start: 2025-06-27 | End: 2025-07-09

## 2025-06-27 NOTE — PROGRESS NOTES
Assessment/Plan   Assessment & Plan  Right sided sciatica    Orders:    predniSONE (Deltasone) 10 mg tablet; Take 4 tablets (40 mg) by mouth once daily for 3 days, THEN 3 tablets (30 mg) once daily for 3 days, THEN 2 tablets (20 mg) once daily for 3 days, THEN 1 tablet (10 mg) once daily for 3 days.    Stretching exercising    Subjective   Patient ID: Mazin Rausch is a 60 y.o. male who presents for Sciatica (Possible muscle strain, did go to  2 weeks ago, not improving enough. Still having tingling toes.).  HPI     Patient was on the elliptical for his birthday. He tried to get rid of his old elliptical in basement and shortly thereafter had R buttock pain that radiates down posterior RLE.     He went to  6/9 and was given flexeril, naproxen, medrol dospak without relief.   Pain is worse with sitting, feels better laying down.   Objective   Physical Exam  Constitutional:       Appearance: Normal appearance.   HENT:      Head: Normocephalic and atraumatic.   Eyes:      Extraocular Movements: Extraocular movements intact.   Musculoskeletal:      Lumbar back: Negative right straight leg raise test and negative left straight leg raise test.   Neurological:      Mental Status: He is alert.               Yoel Mckeon MD 06/27/25 11:03 AM

## 2025-07-29 ENCOUNTER — APPOINTMENT (OUTPATIENT)
Dept: PRIMARY CARE | Facility: CLINIC | Age: 60
End: 2025-07-29
Payer: COMMERCIAL

## 2025-07-29 VITALS
OXYGEN SATURATION: 96 % | DIASTOLIC BLOOD PRESSURE: 90 MMHG | WEIGHT: 219.2 LBS | HEART RATE: 75 BPM | BODY MASS INDEX: 29.73 KG/M2 | SYSTOLIC BLOOD PRESSURE: 132 MMHG | TEMPERATURE: 97.7 F

## 2025-07-29 DIAGNOSIS — R93.1 AGATSTON CORONARY ARTERY CALCIUM SCORE GREATER THAN 400: ICD-10-CM

## 2025-07-29 DIAGNOSIS — I10 PRIMARY HYPERTENSION: ICD-10-CM

## 2025-07-29 DIAGNOSIS — E78.5 ELEVATED LIPIDS: ICD-10-CM

## 2025-07-29 DIAGNOSIS — Z12.5 SCREENING FOR PROSTATE CANCER: ICD-10-CM

## 2025-07-29 DIAGNOSIS — Z13.0 SCREENING FOR DEFICIENCY ANEMIA: ICD-10-CM

## 2025-07-29 DIAGNOSIS — Z00.00 HEALTHCARE MAINTENANCE: Primary | ICD-10-CM

## 2025-07-29 DIAGNOSIS — I25.10 CORONARY ARTERY DISEASE INVOLVING NATIVE CORONARY ARTERY OF NATIVE HEART WITHOUT ANGINA PECTORIS: ICD-10-CM

## 2025-07-29 DIAGNOSIS — I10 HYPERTENSION, UNSPECIFIED TYPE: ICD-10-CM

## 2025-07-29 PROCEDURE — 99396 PREV VISIT EST AGE 40-64: CPT | Performed by: NURSE PRACTITIONER

## 2025-07-29 PROCEDURE — 1036F TOBACCO NON-USER: CPT | Performed by: NURSE PRACTITIONER

## 2025-07-29 PROCEDURE — 3075F SYST BP GE 130 - 139MM HG: CPT | Performed by: NURSE PRACTITIONER

## 2025-07-29 PROCEDURE — 3080F DIAST BP >= 90 MM HG: CPT | Performed by: NURSE PRACTITIONER

## 2025-07-29 RX ORDER — LOSARTAN POTASSIUM AND HYDROCHLOROTHIAZIDE 12.5; 5 MG/1; MG/1
1 TABLET ORAL DAILY
Qty: 90 TABLET | Refills: 1 | Status: SHIPPED | OUTPATIENT
Start: 2025-07-29

## 2025-07-29 RX ORDER — ATORVASTATIN CALCIUM 40 MG/1
40 TABLET, FILM COATED ORAL DAILY
Qty: 90 TABLET | Refills: 2 | Status: SHIPPED | OUTPATIENT
Start: 2025-07-29

## 2025-07-29 ASSESSMENT — ENCOUNTER SYMPTOMS
HEADACHES: 0
SHORTNESS OF BREATH: 0
EYE PAIN: 0
ABDOMINAL PAIN: 0
NAUSEA: 0
PHOTOPHOBIA: 0
WEAKNESS: 0
POLYDIPSIA: 0
UNEXPECTED WEIGHT CHANGE: 0
DIARRHEA: 0
DYSPHORIC MOOD: 0
RHINORRHEA: 0
BRUISES/BLEEDS EASILY: 0
CONSTIPATION: 0
PALPITATIONS: 0
EYE DISCHARGE: 0
NECK PAIN: 0
FATIGUE: 0
SLEEP DISTURBANCE: 0
SORE THROAT: 0
DIZZINESS: 0
DYSURIA: 0
NERVOUS/ANXIOUS: 0
SINUS PRESSURE: 0
POLYPHAGIA: 0
WHEEZING: 0
EYE ITCHING: 0
FEVER: 0
ARTHRALGIAS: 0
VOMITING: 0
MYALGIAS: 0
EYE REDNESS: 0
COUGH: 0
DIFFICULTY URINATING: 0
FREQUENCY: 1
ADENOPATHY: 0
SPEECH DIFFICULTY: 0
BACK PAIN: 1
BLOOD IN STOOL: 0
CHILLS: 0
NUMBNESS: 0
CHEST TIGHTNESS: 0
HEMATURIA: 0

## 2025-07-29 ASSESSMENT — PATIENT HEALTH QUESTIONNAIRE - PHQ9
1. LITTLE INTEREST OR PLEASURE IN DOING THINGS: NOT AT ALL
2. FEELING DOWN, DEPRESSED OR HOPELESS: NOT AT ALL
SUM OF ALL RESPONSES TO PHQ9 QUESTIONS 1 AND 2: 0

## 2025-07-29 NOTE — PROGRESS NOTES
"Subjective   Mazin Rausch is a 60 y.o. male who presents for Annual Exam (Pt is fasting. ) and Side Effects (Side Effects of Losartan. ).    HPI  Last well exam: 2 years ago  Overall health has been good.  There have been no changes in the patients PMH, PSH, FH or social history. Reviewed today.  Diet: \"Could be better.\"   Overall pretty healthy.  Avoids fast food.  Exercise: Was biking and the elliptical. Has not been as much with recent right sciatica issue.  Dental: Regular dental exams. Brushes 2 times daily. Flosses 1 time daily.  Vision: Last Eye exam 7/2025   Corrected Vision with glasses  Tobacco Use: None  Alcohol Use: None  Sexually active:  and sexually active.   No issues with ED.  Immunizations: Discussed Shingrix vaccine and Pneumonia vaccine today.  Colonoscopy: Due in 4/2027  Prostate Cancer Screening: Ordered today    Last labs: ordered today.    Review of Systems   Constitutional:  Negative for chills, fatigue, fever and unexpected weight change.   HENT:  Positive for tinnitus. Negative for congestion, ear pain, hearing loss, nosebleeds, postnasal drip, rhinorrhea, sinus pressure and sore throat.    Eyes:  Negative for photophobia, pain, discharge, redness, itching and visual disturbance.   Respiratory:  Negative for cough, chest tightness, shortness of breath and wheezing.    Cardiovascular:  Negative for chest pain, palpitations and leg swelling.   Gastrointestinal:  Negative for abdominal pain, blood in stool, constipation, diarrhea, nausea and vomiting.   Endocrine: Negative for cold intolerance, heat intolerance, polydipsia, polyphagia and polyuria.   Genitourinary:  Positive for frequency. Negative for difficulty urinating, dysuria, hematuria and urgency.   Musculoskeletal:  Positive for back pain. Negative for arthralgias, myalgias and neck pain.        Still has some pain in the right lower back and down the leg. Prednisone really helped. Feels it is improving continuously. "   Skin:  Negative for rash.   Neurological:  Negative for dizziness, syncope, speech difficulty, weakness, numbness and headaches.   Hematological:  Negative for adenopathy. Does not bruise/bleed easily.   Psychiatric/Behavioral:  Negative for dysphoric mood and sleep disturbance. The patient is not nervous/anxious.        Objective   /90 (BP Location: Left arm, Patient Position: Sitting)   Pulse 75   Temp 36.5 °C (97.7 °F) (Temporal)   Wt 99.4 kg (219 lb 3.2 oz)   SpO2 96%   BMI 29.73 kg/m²     Physical Exam  Constitutional:       General: He is not in acute distress.     Appearance: Normal appearance. He is not toxic-appearing.   HENT:      Head: Normocephalic and atraumatic.      Right Ear: Tympanic membrane and ear canal normal.      Left Ear: Tympanic membrane and ear canal normal.      Nose: Nose normal.      Mouth/Throat:      Mouth: Mucous membranes are moist.      Pharynx: Oropharynx is clear.     Eyes:      Extraocular Movements: Extraocular movements intact.      Conjunctiva/sclera: Conjunctivae normal.      Pupils: Pupils are equal, round, and reactive to light.     Neck:      Thyroid: No thyroid mass or thyromegaly.     Cardiovascular:      Rate and Rhythm: Normal rate and regular rhythm.      Pulses: Normal pulses.      Heart sounds: Normal heart sounds, S1 normal and S2 normal. No murmur heard.  Pulmonary:      Effort: Pulmonary effort is normal. No respiratory distress.      Breath sounds: Normal breath sounds.   Abdominal:      General: Abdomen is flat. Bowel sounds are normal.      Palpations: Abdomen is soft.      Tenderness: There is no abdominal tenderness.     Musculoskeletal:         General: Normal range of motion.      Cervical back: Normal range of motion and neck supple.      Right lower leg: No edema.      Left lower leg: No edema.   Lymphadenopathy:      Cervical: No cervical adenopathy.     Skin:     General: Skin is warm and dry.     Neurological:      Mental Status: He is  alert and oriented to person, place, and time.      Cranial Nerves: No cranial nerve deficit.      Sensory: No sensory deficit.      Motor: No weakness.      Coordination: Coordination normal.      Gait: Gait normal.      Deep Tendon Reflexes: Reflexes are normal and symmetric. Reflexes normal.     Psychiatric:         Attention and Perception: Attention normal.         Mood and Affect: Mood and affect normal.         Speech: Speech normal.         Behavior: Behavior normal.         Thought Content: Thought content normal.         Judgment: Judgment normal.       Assessment/Plan   Problem List Items Addressed This Visit       Hypertension    Relevant Medications    losartan-hydrochlorothiazide (Hyzaar) 50-12.5 mg tablet    Other Relevant Orders    Comprehensive Metabolic Panel    Elevated lipids    Relevant Medications    atorvastatin (Lipitor) 40 mg tablet    Other Relevant Orders    Lipid Panel    [facility] coronary artery calcium score greater than 400    Relevant Medications    atorvastatin (Lipitor) 40 mg tablet    Coronary artery disease involving native coronary artery of native heart without angina pectoris    Relevant Medications    losartan-hydrochlorothiazide (Hyzaar) 50-12.5 mg tablet    atorvastatin (Lipitor) 40 mg tablet    Healthcare maintenance - Primary     Other Visit Diagnoses         Screening for prostate cancer        Relevant Orders    Prostate Specific Antigen      Screening for deficiency anemia        Relevant Orders    CBC        Advised to focus on a low sodium/low fat diet (whole grains, fresh fruits and vegetables, lean meats). Avoid foods high in sugar.  Increase exercise as tolerated.  Continue medications at the current dose.  Check fasting labs as ordered today.  Recommended looking into the Shingrix vaccine (shingles vaccine) and Prevnar 20 OR Prevnar 21 (Pneumonia vaccine)  Follow up in 6 months or sooner if needed.     It has been a pleasure seeing you today!

## 2025-07-29 NOTE — PATIENT INSTRUCTIONS
Focus on a low sodium/low fat diet (whole grains, fresh fruits and vegetables, lean meats). Avoid foods high in sugar.  Increase exercise as tolerated.  Continue medications at the current dose.  Check fasting labs as ordered today.  Recommend looking into the Shingrix vaccine (shingles vaccine) and Prevnar 20 OR Prevnar 21 (Pneumonia vaccines.  Follow up in 6 months or sooner if needed.

## 2025-07-30 LAB
ALBUMIN SERPL-MCNC: 4.7 G/DL (ref 3.6–5.1)
ALP SERPL-CCNC: 67 U/L (ref 35–144)
ALT SERPL-CCNC: 16 U/L (ref 9–46)
ANION GAP SERPL CALCULATED.4IONS-SCNC: 7 MMOL/L (CALC) (ref 7–17)
AST SERPL-CCNC: 21 U/L (ref 10–35)
BILIRUB SERPL-MCNC: 1.1 MG/DL (ref 0.2–1.2)
BUN SERPL-MCNC: 11 MG/DL (ref 7–25)
CALCIUM SERPL-MCNC: 9.5 MG/DL (ref 8.6–10.3)
CHLORIDE SERPL-SCNC: 102 MMOL/L (ref 98–110)
CHOLEST SERPL-MCNC: 185 MG/DL
CHOLEST/HDLC SERPL: 3.5 (CALC)
CO2 SERPL-SCNC: 31 MMOL/L (ref 20–32)
CREAT SERPL-MCNC: 0.78 MG/DL (ref 0.7–1.35)
EGFRCR SERPLBLD CKD-EPI 2021: 102 ML/MIN/1.73M2
ERYTHROCYTE [DISTWIDTH] IN BLOOD BY AUTOMATED COUNT: 12.1 % (ref 11–15)
GLUCOSE SERPL-MCNC: 95 MG/DL (ref 65–99)
HCT VFR BLD AUTO: 50.3 % (ref 38.5–50)
HDLC SERPL-MCNC: 53 MG/DL
HGB BLD-MCNC: 16.7 G/DL (ref 13.2–17.1)
LDLC SERPL CALC-MCNC: 104 MG/DL (CALC)
MCH RBC QN AUTO: 29.6 PG (ref 27–33)
MCHC RBC AUTO-ENTMCNC: 33.2 G/DL (ref 32–36)
MCV RBC AUTO: 89.2 FL (ref 80–100)
NONHDLC SERPL-MCNC: 132 MG/DL (CALC)
PLATELET # BLD AUTO: 263 THOUSAND/UL (ref 140–400)
PMV BLD REES-ECKER: 9 FL (ref 7.5–12.5)
POTASSIUM SERPL-SCNC: 3.9 MMOL/L (ref 3.5–5.3)
PROT SERPL-MCNC: 7.1 G/DL (ref 6.1–8.1)
PSA SERPL-MCNC: 0.47 NG/ML
RBC # BLD AUTO: 5.64 MILLION/UL (ref 4.2–5.8)
SODIUM SERPL-SCNC: 140 MMOL/L (ref 135–146)
TRIGL SERPL-MCNC: 162 MG/DL
WBC # BLD AUTO: 6.3 THOUSAND/UL (ref 3.8–10.8)

## 2025-08-12 ENCOUNTER — TELEPHONE (OUTPATIENT)
Dept: PRIMARY CARE | Facility: CLINIC | Age: 60
End: 2025-08-12
Payer: COMMERCIAL

## 2026-01-27 ENCOUNTER — APPOINTMENT (OUTPATIENT)
Dept: PRIMARY CARE | Facility: CLINIC | Age: 61
End: 2026-01-27
Payer: COMMERCIAL